# Patient Record
Sex: FEMALE | Race: WHITE | Employment: OTHER | URBAN - METROPOLITAN AREA
[De-identification: names, ages, dates, MRNs, and addresses within clinical notes are randomized per-mention and may not be internally consistent; named-entity substitution may affect disease eponyms.]

---

## 2017-07-26 PROCEDURE — 88305 TISSUE EXAM BY PATHOLOGIST: CPT | Performed by: INTERNAL MEDICINE

## 2018-05-29 PROBLEM — M18.12 PRIMARY OSTEOARTHRITIS OF FIRST CARPOMETACARPAL JOINT OF LEFT HAND: Status: ACTIVE | Noted: 2018-05-29

## 2021-08-23 ENCOUNTER — LAB ENCOUNTER (OUTPATIENT)
Dept: LAB | Age: 56
End: 2021-08-23
Attending: INTERNAL MEDICINE
Payer: COMMERCIAL

## 2021-08-23 ENCOUNTER — OFFICE VISIT (OUTPATIENT)
Dept: INTERNAL MEDICINE CLINIC | Facility: CLINIC | Age: 56
End: 2021-08-23
Payer: COMMERCIAL

## 2021-08-23 VITALS
HEART RATE: 66 BPM | SYSTOLIC BLOOD PRESSURE: 102 MMHG | TEMPERATURE: 97 F | RESPIRATION RATE: 16 BRPM | DIASTOLIC BLOOD PRESSURE: 60 MMHG | HEIGHT: 70 IN | BODY MASS INDEX: 29.63 KG/M2 | WEIGHT: 207 LBS | OXYGEN SATURATION: 93 %

## 2021-08-23 DIAGNOSIS — Z13.0 SCREENING FOR BLOOD DISEASE: ICD-10-CM

## 2021-08-23 DIAGNOSIS — Z13.220 SCREENING FOR LIPID DISORDERS: ICD-10-CM

## 2021-08-23 DIAGNOSIS — G47.33 OSA (OBSTRUCTIVE SLEEP APNEA): Primary | ICD-10-CM

## 2021-08-23 DIAGNOSIS — M79.673 CHRONIC FOOT PAIN, UNSPECIFIED LATERALITY: ICD-10-CM

## 2021-08-23 DIAGNOSIS — Z13.228 SCREENING FOR METABOLIC DISORDER: ICD-10-CM

## 2021-08-23 DIAGNOSIS — Z00.00 LABORATORY EXAMINATION ORDERED AS PART OF A COMPLETE PHYSICAL EXAMINATION: ICD-10-CM

## 2021-08-23 DIAGNOSIS — Z12.31 ENCOUNTER FOR SCREENING MAMMOGRAM FOR MALIGNANT NEOPLASM OF BREAST: ICD-10-CM

## 2021-08-23 DIAGNOSIS — F33.42 RECURRENT MAJOR DEPRESSION IN FULL REMISSION (HCC): ICD-10-CM

## 2021-08-23 DIAGNOSIS — G89.29 CHRONIC FOOT PAIN, UNSPECIFIED LATERALITY: ICD-10-CM

## 2021-08-23 DIAGNOSIS — Z13.29 SCREENING FOR THYROID DISORDER: ICD-10-CM

## 2021-08-23 DIAGNOSIS — F41.9 ANXIETY: ICD-10-CM

## 2021-08-23 LAB
ALBUMIN SERPL-MCNC: 3.7 G/DL (ref 3.4–5)
ALBUMIN/GLOB SERPL: 1 {RATIO} (ref 1–2)
ALP LIVER SERPL-CCNC: 74 U/L
ALT SERPL-CCNC: 56 U/L
ANION GAP SERPL CALC-SCNC: 4 MMOL/L (ref 0–18)
AST SERPL-CCNC: 27 U/L (ref 15–37)
BASOPHILS # BLD AUTO: 0.05 X10(3) UL (ref 0–0.2)
BASOPHILS NFR BLD AUTO: 0.8 %
BILIRUB SERPL-MCNC: 0.5 MG/DL (ref 0.1–2)
BUN BLD-MCNC: 23 MG/DL (ref 7–18)
CALCIUM BLD-MCNC: 9 MG/DL (ref 8.5–10.1)
CHLORIDE SERPL-SCNC: 107 MMOL/L (ref 98–112)
CHOLEST SMN-MCNC: 243 MG/DL (ref ?–200)
CO2 SERPL-SCNC: 28 MMOL/L (ref 21–32)
CREAT BLD-MCNC: 1.35 MG/DL
EOSINOPHIL # BLD AUTO: 0.3 X10(3) UL (ref 0–0.7)
EOSINOPHIL NFR BLD AUTO: 4.8 %
ERYTHROCYTE [DISTWIDTH] IN BLOOD BY AUTOMATED COUNT: 11.8 %
GLOBULIN PLAS-MCNC: 3.6 G/DL (ref 2.8–4.4)
GLUCOSE BLD-MCNC: 86 MG/DL (ref 70–99)
HCT VFR BLD AUTO: 44.6 %
HDLC SERPL-MCNC: 48 MG/DL (ref 40–59)
HGB BLD-MCNC: 14.2 G/DL
IMM GRANULOCYTES # BLD AUTO: 0.02 X10(3) UL (ref 0–1)
IMM GRANULOCYTES NFR BLD: 0.3 %
LDLC SERPL CALC-MCNC: 159 MG/DL (ref ?–100)
LYMPHOCYTES # BLD AUTO: 2.43 X10(3) UL (ref 1–4)
LYMPHOCYTES NFR BLD AUTO: 39.2 %
M PROTEIN MFR SERPL ELPH: 7.3 G/DL (ref 6.4–8.2)
MCH RBC QN AUTO: 29.5 PG (ref 26–34)
MCHC RBC AUTO-ENTMCNC: 31.8 G/DL (ref 31–37)
MCV RBC AUTO: 92.7 FL
MONOCYTES # BLD AUTO: 0.73 X10(3) UL (ref 0.1–1)
MONOCYTES NFR BLD AUTO: 11.8 %
NEUTROPHILS # BLD AUTO: 2.67 X10 (3) UL (ref 1.5–7.7)
NEUTROPHILS # BLD AUTO: 2.67 X10(3) UL (ref 1.5–7.7)
NEUTROPHILS NFR BLD AUTO: 43.1 %
NONHDLC SERPL-MCNC: 195 MG/DL (ref ?–130)
OSMOLALITY SERPL CALC.SUM OF ELEC: 291 MOSM/KG (ref 275–295)
PATIENT FASTING Y/N/NP: YES
PATIENT FASTING Y/N/NP: YES
PLATELET # BLD AUTO: 266 10(3)UL (ref 150–450)
POTASSIUM SERPL-SCNC: 4.7 MMOL/L (ref 3.5–5.1)
RBC # BLD AUTO: 4.81 X10(6)UL
SODIUM SERPL-SCNC: 139 MMOL/L (ref 136–145)
T4 FREE SERPL-MCNC: 0.9 NG/DL (ref 0.8–1.7)
TRIGL SERPL-MCNC: 195 MG/DL (ref 30–149)
TSI SER-ACNC: 5.39 MIU/ML (ref 0.36–3.74)
VLDLC SERPL CALC-MCNC: 38 MG/DL (ref 0–30)
WBC # BLD AUTO: 6.2 X10(3) UL (ref 4–11)

## 2021-08-23 PROCEDURE — 3078F DIAST BP <80 MM HG: CPT | Performed by: INTERNAL MEDICINE

## 2021-08-23 PROCEDURE — 3074F SYST BP LT 130 MM HG: CPT | Performed by: INTERNAL MEDICINE

## 2021-08-23 PROCEDURE — 99204 OFFICE O/P NEW MOD 45 MIN: CPT | Performed by: INTERNAL MEDICINE

## 2021-08-23 PROCEDURE — 3008F BODY MASS INDEX DOCD: CPT | Performed by: INTERNAL MEDICINE

## 2021-08-23 PROCEDURE — 80050 GENERAL HEALTH PANEL: CPT | Performed by: INTERNAL MEDICINE

## 2021-08-23 PROCEDURE — 84439 ASSAY OF FREE THYROXINE: CPT | Performed by: INTERNAL MEDICINE

## 2021-08-23 PROCEDURE — 80061 LIPID PANEL: CPT | Performed by: INTERNAL MEDICINE

## 2021-08-23 RX ORDER — ALPRAZOLAM 0.5 MG/1
0.5 TABLET ORAL DAILY PRN
Qty: 14 TABLET | Refills: 0 | Status: SHIPPED | OUTPATIENT
Start: 2021-08-23 | End: 2021-10-11

## 2021-08-23 NOTE — PROGRESS NOTES
Angelic Seat  3/10/1965    Patient presents with:  Establish Care: RG rm 6 discuss horrible foot pain, discuss recent stop of antidepressants.   Referral: discuss getting referrals      Efrainleón Hoyt is a 64year old female who presents to e Refill   • PROGESTERONE OR Take by mouth. Liquid OTC     • ALPRAZolam 0.5 MG Oral Tab Take 1 tablet (0.5 mg total) by mouth daily as needed for Sleep or Anxiety.  14 tablet 0   • OMEPRAZOLE 20 MG Oral Capsule Delayed Release TAKE ONE CAPSULE BY MOUTH TWICE Normocephalic and atraumatic. TM's wnl. Eyes: Conjunctivae wnl. Neck: Normal range of motion. Neck supple. Normal carotid pulses. No masses. Cardiovascular: Normal rate, regular rhythm and intact distal pulses. No murmur, rubs or gallops.    Pulmonary/ Sonam Holcomb MD

## 2021-09-08 ENCOUNTER — OFFICE VISIT (OUTPATIENT)
Dept: SLEEP CENTER | Age: 56
End: 2021-09-08
Attending: INTERNAL MEDICINE
Payer: COMMERCIAL

## 2021-09-08 DIAGNOSIS — G47.33 OSA (OBSTRUCTIVE SLEEP APNEA): ICD-10-CM

## 2021-09-08 PROCEDURE — 95806 SLEEP STUDY UNATT&RESP EFFT: CPT

## 2021-09-17 NOTE — PROCEDURES
[address]       Accredited by the [facility] ([facility])    PATIENT'S NAME:        Bao De Leon  ATTENDING PHYSICIAN:   Galilea Girffith M.D. REFERRING PHYSICIAN:   Demetris Brannon MD  PATIENT per minute. IMPRESSION:  This study revealed evidence of obstructive sleep apnea with associated oxyhemoglobin desaturations. RECOMMENDATIONS:    1. This patient should proceed with definitive therapy by undergoing CPAP titration.   2.   This patien

## 2021-10-22 ENCOUNTER — TELEPHONE (OUTPATIENT)
Dept: INTERNAL MEDICINE CLINIC | Facility: CLINIC | Age: 56
End: 2021-10-22

## 2021-10-22 NOTE — TELEPHONE ENCOUNTER
Mammogram received from 1 [facility] [address], placed in RN bin for review

## 2021-10-25 ENCOUNTER — MED REC SCAN ONLY (OUTPATIENT)
Dept: INTERNAL MEDICINE CLINIC | Facility: CLINIC | Age: 56
End: 2021-10-25

## 2021-11-01 RX ORDER — ALPRAZOLAM 0.5 MG/1
TABLET ORAL
Qty: 20 TABLET | Refills: 0 | Status: SHIPPED | OUTPATIENT
Start: 2021-11-01 | End: 2021-11-29

## 2021-11-01 NOTE — TELEPHONE ENCOUNTER
Last VISIT 08/23/21    Last CPE Not on file    Last REFILL 10/11/21 qty 20 w/0 refills    Last LABS 10/26/21 Multiple labs done    No future appointments. Per PROTOCOL? Not on protocol      Please Approve or Deny.

## 2021-11-29 RX ORDER — ALPRAZOLAM 0.5 MG/1
TABLET ORAL
Qty: 20 TABLET | Refills: 0 | Status: SHIPPED | OUTPATIENT
Start: 2021-11-29 | End: 2021-12-16

## 2021-12-13 ENCOUNTER — MED REC SCAN ONLY (OUTPATIENT)
Dept: INTERNAL MEDICINE CLINIC | Facility: CLINIC | Age: 56
End: 2021-12-13

## 2021-12-16 RX ORDER — ALPRAZOLAM 0.5 MG/1
TABLET ORAL
Qty: 20 TABLET | Refills: 0 | Status: SHIPPED | OUTPATIENT
Start: 2021-12-16 | End: 2022-01-12

## 2021-12-16 NOTE — TELEPHONE ENCOUNTER
Received fax from pharmacy asking to clarify max daily dose for Xanax. Pending order, please review.

## 2022-01-12 RX ORDER — ALPRAZOLAM 0.5 MG/1
TABLET ORAL
Qty: 20 TABLET | Refills: 0 | Status: SHIPPED | OUTPATIENT
Start: 2022-01-12

## 2022-01-12 NOTE — TELEPHONE ENCOUNTER
Last OV 8/23/21  Last PE   8/23/21  Last REFILL   Medication Quantity Refills Start End   ALPRAZolam 0.5 MG Oral Tab 20 tablet 0 12/16/2021      Last LABS 8/23/21 tsh, lipid, cmp, cbc    Future Appointments   Date Time Provider Jose Bello   1/25/202

## 2022-01-21 RX ORDER — FLUOXETINE HYDROCHLORIDE 20 MG/1
CAPSULE ORAL
Qty: 90 CAPSULE | Refills: 1 | Status: SHIPPED | OUTPATIENT
Start: 2022-01-21

## 2022-01-21 RX ORDER — BUPROPION HYDROCHLORIDE 150 MG/1
TABLET ORAL
Qty: 270 TABLET | Refills: 1 | Status: SHIPPED | OUTPATIENT
Start: 2022-01-21

## 2022-01-21 RX ORDER — FLUOXETINE HYDROCHLORIDE 40 MG/1
CAPSULE ORAL
Qty: 90 CAPSULE | Refills: 1 | Status: SHIPPED | OUTPATIENT
Start: 2022-01-21

## 2022-01-21 NOTE — TELEPHONE ENCOUNTER
Last VISIT 21     Last CPE Not on file    Last REFILL 10/18/21   buPROPion 150 MG Oral Tablet 24 Hr () 270 tablet 1     FLUoxetine HCl (PROZAC) 40 MG Oral Cap 90 capsule 1     FLUoxetine (PROZAC) 20 MG Oral Cap 90 capsule 1     Last LABS 10/26

## 2022-02-17 RX ORDER — ALPRAZOLAM 0.5 MG/1
TABLET ORAL
Qty: 20 TABLET | Refills: 0 | Status: SHIPPED | OUTPATIENT
Start: 2022-02-17 | End: 2022-03-14

## 2022-02-17 RX ORDER — LEVOTHYROXINE SODIUM 0.03 MG/1
TABLET ORAL
Qty: 90 TABLET | Refills: 0 | Status: SHIPPED | OUTPATIENT
Start: 2022-02-17

## 2022-02-17 NOTE — TELEPHONE ENCOUNTER
Last VISIT 08/23/21    Last CPE Not on file     Last REFILL 01/12/22 qty 20 w/0 refills    Last LABS 10/26/21 Multiple labs done    No Future Appointments      Per PROTOCOL? Not on protocol      Please Approve or Deny.

## 2022-03-14 RX ORDER — ALPRAZOLAM 0.5 MG/1
TABLET ORAL
Qty: 20 TABLET | Refills: 0 | Status: SHIPPED | OUTPATIENT
Start: 2022-03-14

## 2022-03-14 NOTE — TELEPHONE ENCOUNTER
Last VISIT 08/23/21    Last CPE Not on file    Last REFILL 02/17/22 qty 20 w/0 refills    Last LABS 10/26/21 Multiple labs done     No Future Appointments      Per PROTOCOL? Not on protocol      Please Approve or Deny.

## 2022-03-29 ENCOUNTER — MED REC SCAN ONLY (OUTPATIENT)
Dept: INTERNAL MEDICINE CLINIC | Facility: CLINIC | Age: 57
End: 2022-03-29

## 2022-04-06 RX ORDER — ALPRAZOLAM 0.5 MG/1
TABLET ORAL
Qty: 20 TABLET | Refills: 0 | Status: SHIPPED | OUTPATIENT
Start: 2022-04-06

## 2022-04-25 RX ORDER — ALPRAZOLAM 0.5 MG/1
TABLET ORAL
Qty: 20 TABLET | Refills: 0 | Status: SHIPPED | OUTPATIENT
Start: 2022-04-25

## 2022-04-25 NOTE — TELEPHONE ENCOUNTER
Last VISIT 08/23/21    Last CPE Not on file    Last REFILL 04/06/22 qty 20 w/0 refills    Last LABS 04/05/22 Multiple labs done    No future appointments. Per PROTOCOL? Not on protocol      Please Approve or Deny.

## 2022-05-16 PROBLEM — E03.9 HYPOTHYROIDISM: Status: ACTIVE | Noted: 2022-05-16

## 2022-05-16 PROBLEM — F41.9 ANXIETY: Status: ACTIVE | Noted: 2022-05-16

## 2022-05-16 RX ORDER — LEVOTHYROXINE SODIUM 0.03 MG/1
TABLET ORAL
Qty: 90 TABLET | Refills: 0 | Status: SHIPPED | OUTPATIENT
Start: 2022-05-16

## 2022-05-20 RX ORDER — ALPRAZOLAM 0.5 MG/1
TABLET ORAL
Qty: 30 TABLET | Refills: 0 | Status: SHIPPED | OUTPATIENT
Start: 2022-05-20 | End: 2022-05-31

## 2022-05-31 RX ORDER — ALPRAZOLAM 0.5 MG/1
TABLET ORAL
Qty: 30 TABLET | Refills: 0 | Status: SHIPPED | OUTPATIENT
Start: 2022-05-31

## 2022-05-31 NOTE — TELEPHONE ENCOUNTER
Last VISIT 05/16/22    Last CPE Not on file    Last REFILL 05/20/22 qty 30 w/0 refills    Last LABS 04/05/22 Multiple labs done    No future appointments. Per PROTOCOL? Not on protocol      Please Approve or Deny.

## 2022-07-01 RX ORDER — ALPRAZOLAM 0.5 MG/1
TABLET ORAL
Qty: 30 TABLET | Refills: 0 | Status: SHIPPED | OUTPATIENT
Start: 2022-07-01

## 2022-07-26 RX ORDER — ALPRAZOLAM 0.5 MG/1
TABLET ORAL
Qty: 30 TABLET | Refills: 0 | Status: SHIPPED | OUTPATIENT
Start: 2022-07-26

## 2022-07-26 NOTE — TELEPHONE ENCOUNTER
Last VISIT 05/16/22    Last CPE Not on file    Last REFILL 07/01/22 qty 30 w/0 refills    Last LABS 04/05/22 Multiple labs done    No future appointments. Per PROTOCOL? Not on protocol      Please Approve or Deny.

## 2022-07-31 DIAGNOSIS — M17.12 ARTHRITIS OF KNEE, LEFT: ICD-10-CM

## 2022-08-01 RX ORDER — BUPROPION HYDROCHLORIDE 150 MG/1
TABLET ORAL
Qty: 270 TABLET | Refills: 1 | Status: SHIPPED | OUTPATIENT
Start: 2022-08-01

## 2022-08-01 RX ORDER — FLUOXETINE HYDROCHLORIDE 20 MG/1
CAPSULE ORAL
Qty: 90 CAPSULE | Refills: 1 | Status: SHIPPED | OUTPATIENT
Start: 2022-08-01

## 2022-08-01 RX ORDER — FLUOXETINE HYDROCHLORIDE 40 MG/1
CAPSULE ORAL
Qty: 90 CAPSULE | Refills: 0 | Status: SHIPPED | OUTPATIENT
Start: 2022-08-01

## 2022-08-01 NOTE — TELEPHONE ENCOUNTER
Last VISIT 05/16/22    Last CPE Not on file    Last REFILL 10/18/21  FLUoxetine HCl (PROZAC) 40 MG Oral Cap (Discontinued) 90 capsule 1     01/21/22   BUPROPION 150 MG Oral Tablet 24 Hr 270 tablet 1     FLUOXETINE 20 MG Oral Cap 90 capsule 1     Last LABS 04/05/22 UA done    No future appointments. Per PROTOCOL? Not on protocol      Please Approve or Deny.

## 2022-08-13 ENCOUNTER — OFFICE VISIT (OUTPATIENT)
Dept: FAMILY MEDICINE CLINIC | Facility: CLINIC | Age: 57
End: 2022-08-13
Payer: COMMERCIAL

## 2022-08-13 VITALS
DIASTOLIC BLOOD PRESSURE: 82 MMHG | RESPIRATION RATE: 18 BRPM | SYSTOLIC BLOOD PRESSURE: 116 MMHG | OXYGEN SATURATION: 98 % | HEART RATE: 79 BPM | TEMPERATURE: 97 F

## 2022-08-13 DIAGNOSIS — J02.9 SORE THROAT: Primary | ICD-10-CM

## 2022-08-13 DIAGNOSIS — H92.01 RIGHT EAR PAIN: ICD-10-CM

## 2022-08-13 LAB
CONTROL LINE PRESENT WITH A CLEAR BACKGROUND (YES/NO): YES YES/NO
OPERATOR ID: NORMAL
POCT LOT NUMBER: NORMAL
RAPID SARS-COV-2 BY PCR: NOT DETECTED

## 2022-08-13 PROCEDURE — 3074F SYST BP LT 130 MM HG: CPT | Performed by: NURSE PRACTITIONER

## 2022-08-13 PROCEDURE — 87880 STREP A ASSAY W/OPTIC: CPT | Performed by: NURSE PRACTITIONER

## 2022-08-13 PROCEDURE — U0002 COVID-19 LAB TEST NON-CDC: HCPCS | Performed by: NURSE PRACTITIONER

## 2022-08-13 PROCEDURE — 3079F DIAST BP 80-89 MM HG: CPT | Performed by: NURSE PRACTITIONER

## 2022-08-13 PROCEDURE — 99213 OFFICE O/P EST LOW 20 MIN: CPT | Performed by: NURSE PRACTITIONER

## 2022-08-15 RX ORDER — LEVOTHYROXINE SODIUM 0.03 MG/1
TABLET ORAL
Qty: 90 TABLET | Refills: 0 | Status: SHIPPED | OUTPATIENT
Start: 2022-08-15

## 2022-08-19 ENCOUNTER — MED REC SCAN ONLY (OUTPATIENT)
Dept: INTERNAL MEDICINE CLINIC | Facility: CLINIC | Age: 57
End: 2022-08-19

## 2022-08-24 RX ORDER — ALPRAZOLAM 0.5 MG/1
TABLET ORAL
Qty: 30 TABLET | Refills: 0 | Status: SHIPPED | OUTPATIENT
Start: 2022-08-24

## 2022-08-24 NOTE — TELEPHONE ENCOUNTER
Last VISIT 05/16/22    Last CPE Not on file    Last REFILL 07/26/22 qty 30 w/0 refills    Last LABS 08/13/22 Covid ans Strep done    No future appointments. Per PROTOCOL? Not on protocol      Please Approve or Deny.

## 2022-09-06 RX ORDER — ALPRAZOLAM 0.5 MG/1
TABLET ORAL
Qty: 30 TABLET | Refills: 0 | Status: SHIPPED | OUTPATIENT
Start: 2022-09-06

## 2022-09-06 NOTE — TELEPHONE ENCOUNTER
Last VISIT 05/16/22    Last CPE Not on file     Last REFILL 08/24/22 qty 30 w/0 refills    Last LABS 08/13/22 Strep done    No future appointments. Per PROTOCOL? Not on protocol      Please Approve or Deny.

## 2022-10-13 RX ORDER — ALPRAZOLAM 0.5 MG/1
TABLET ORAL
Qty: 30 TABLET | Refills: 0 | Status: SHIPPED | OUTPATIENT
Start: 2022-10-13

## 2022-10-26 RX ORDER — ALPRAZOLAM 0.5 MG/1
TABLET ORAL
Qty: 30 TABLET | Refills: 0 | OUTPATIENT
Start: 2022-10-26

## 2022-11-01 RX ORDER — ALPRAZOLAM 0.5 MG/1
TABLET ORAL
Qty: 14 TABLET | Refills: 0 | Status: SHIPPED | OUTPATIENT
Start: 2022-11-01

## 2022-11-01 NOTE — TELEPHONE ENCOUNTER
Last VISIT 05/16/22    Last CPE Not on file    Last REFILL 10/13/22 qty 30 w/0 refills    Last LABS 08/13/22 Strep, covid done    No future appointments. Per PROTOCOL? Not on protocol      Please Approve or Deny.

## 2022-11-15 DIAGNOSIS — M17.12 ARTHRITIS OF KNEE, LEFT: ICD-10-CM

## 2022-11-15 RX ORDER — FLUOXETINE HYDROCHLORIDE 40 MG/1
CAPSULE ORAL
Qty: 90 CAPSULE | Refills: 0 | Status: SHIPPED | OUTPATIENT
Start: 2022-11-15

## 2022-11-15 NOTE — TELEPHONE ENCOUNTER
Last VISIT 05/16/22    Last CPE Not on file    Last REFILL 08/01/22 qty 90 w/0 refills    Last LABS 08/13/22 Stress test done    No future appointments. Per PROTOCOL? Not on protocol        Please Approve or Deny.

## 2022-12-30 ENCOUNTER — APPOINTMENT (OUTPATIENT)
Dept: GENERAL RADIOLOGY | Age: 57
End: 2022-12-30
Attending: PHYSICIAN ASSISTANT
Payer: COMMERCIAL

## 2022-12-30 ENCOUNTER — HOSPITAL ENCOUNTER (OUTPATIENT)
Age: 57
Discharge: HOME | End: 2022-12-30
Payer: COMMERCIAL

## 2022-12-30 VITALS
WEIGHT: 210 LBS | HEIGHT: 70 IN | TEMPERATURE: 98 F | RESPIRATION RATE: 20 BRPM | BODY MASS INDEX: 30.06 KG/M2 | HEART RATE: 90 BPM | OXYGEN SATURATION: 99 % | SYSTOLIC BLOOD PRESSURE: 130 MMHG | DIASTOLIC BLOOD PRESSURE: 84 MMHG

## 2022-12-30 DIAGNOSIS — R06.2 WHEEZING: ICD-10-CM

## 2022-12-30 DIAGNOSIS — R05.8 COUGH PRODUCTIVE OF YELLOW SPUTUM: Primary | ICD-10-CM

## 2022-12-30 LAB — SARS-COV-2 RNA RESP QL NAA+PROBE: NOT DETECTED

## 2022-12-30 PROCEDURE — 71046 X-RAY EXAM CHEST 2 VIEWS: CPT | Performed by: PHYSICIAN ASSISTANT

## 2022-12-30 PROCEDURE — U0002 COVID-19 LAB TEST NON-CDC: HCPCS | Performed by: PHYSICIAN ASSISTANT

## 2022-12-30 PROCEDURE — 99203 OFFICE O/P NEW LOW 30 MIN: CPT | Performed by: PHYSICIAN ASSISTANT

## 2022-12-30 RX ORDER — BENZONATATE 100 MG/1
100 CAPSULE ORAL 3 TIMES DAILY PRN
Qty: 30 CAPSULE | Refills: 0 | Status: SHIPPED | OUTPATIENT
Start: 2022-12-30 | End: 2023-01-29

## 2022-12-30 RX ORDER — MONTELUKAST SODIUM 10 MG/1
10 TABLET ORAL NIGHTLY
Qty: 30 TABLET | Refills: 0 | Status: SHIPPED | OUTPATIENT
Start: 2022-12-30

## 2022-12-30 RX ORDER — PREDNISONE 20 MG/1
40 TABLET ORAL DAILY
Qty: 8 TABLET | Refills: 0 | Status: SHIPPED | OUTPATIENT
Start: 2022-12-30 | End: 2023-01-03

## 2022-12-30 RX ORDER — ALBUTEROL SULFATE 90 UG/1
2 AEROSOL, METERED RESPIRATORY (INHALATION) ONCE
Status: COMPLETED | OUTPATIENT
Start: 2022-12-30 | End: 2022-12-30

## 2022-12-30 RX ORDER — DEXAMETHASONE 4 MG/1
12 TABLET ORAL ONCE
Status: COMPLETED | OUTPATIENT
Start: 2022-12-30 | End: 2022-12-30

## 2022-12-30 NOTE — ED INITIAL ASSESSMENT (HPI)
Pt with co cough milky yellow secretions. Cough worse at night. Pt also with ha and sorethroat. Unsure if fever. Co aches and sweating.

## 2022-12-30 NOTE — DISCHARGE INSTRUCTIONS
Please return to the ER/clinic if symptoms worsen. Follow-up with your PCP in 24-48 hours as needed. The Decadron will work in your system the next several days. We will send in some additional prednisone to start on day 2 or 3 if symptoms persist.  Take the Singulair daily. Also recommend sleeping more upright to help with the cough as well as purchasing some over-the-counter Cepacol spray to help with the cough reflex. Use your inhaler every 4-6 hours as needed. You may take the Tessalon Perles to help with the cough. Motrin and/or Tylenol for fever and pain. If anything changes i.e. increasing fevers or shortness of breath go directly to the emergency room. Otherwise follow-up with your primary care physician for further evaluation and treatment.

## 2023-01-11 DIAGNOSIS — F41.9 ANXIETY: ICD-10-CM

## 2023-01-11 RX ORDER — ALPRAZOLAM 0.5 MG/1
TABLET ORAL
Qty: 20 TABLET | Refills: 0 | Status: SHIPPED | OUTPATIENT
Start: 2023-01-11

## 2023-01-11 NOTE — TELEPHONE ENCOUNTER
Last OV: 11/30/22 f/u meds  Last CPE: unknown    No future appointments. Latest labs: 4/5/22 CBC and CMP    Latest RX: ALPRAZOLAM 0.5MG TABLETS 20 tabs 0 refills on 11/30/22    Per protocol, not on protocol. RX Pending.

## 2023-02-20 DIAGNOSIS — F41.9 ANXIETY: ICD-10-CM

## 2023-02-21 RX ORDER — ALPRAZOLAM 0.5 MG/1
TABLET ORAL
Qty: 20 TABLET | Refills: 0 | Status: SHIPPED | OUTPATIENT
Start: 2023-02-21

## 2023-02-21 NOTE — TELEPHONE ENCOUNTER
Last visit- 05/16/2022 anxiety    Last refill- 01/11/2023 alprazolam 0.5mg QTY20 0R    Last labs- 04/05/2022 cbc, cmp  Ordered by Rizwana Burk, DO    No future appointments. Per Protocol?   Please approve or deny

## 2023-03-08 DIAGNOSIS — F41.9 ANXIETY: ICD-10-CM

## 2023-03-10 RX ORDER — ALPRAZOLAM 0.5 MG/1
TABLET ORAL
Qty: 20 TABLET | Refills: 0 | Status: SHIPPED | OUTPATIENT
Start: 2023-03-10

## 2023-03-30 DIAGNOSIS — F41.9 ANXIETY: ICD-10-CM

## 2023-04-03 RX ORDER — ALPRAZOLAM 0.5 MG/1
TABLET ORAL
Qty: 20 TABLET | Refills: 0 | Status: SHIPPED | OUTPATIENT
Start: 2023-04-03

## 2023-04-03 NOTE — TELEPHONE ENCOUNTER
Last visit- 05/16/2022 anxiety     Last refill- 03/10/2023 alprazolam 0.5mg QTY20 0R    Last labs- 04/05/2022 cbc, cmp    No future appointments. Per Protocol?   Please approve or deny

## 2023-05-01 DIAGNOSIS — F41.9 ANXIETY: ICD-10-CM

## 2023-05-02 RX ORDER — BUPROPION HYDROCHLORIDE 150 MG/1
450 TABLET ORAL DAILY
Qty: 90 TABLET | Refills: 0 | Status: SHIPPED | OUTPATIENT
Start: 2023-05-02 | End: 2023-06-01

## 2023-05-02 RX ORDER — FLUOXETINE HYDROCHLORIDE 20 MG/1
20 CAPSULE ORAL DAILY
Qty: 30 CAPSULE | Refills: 0 | Status: SHIPPED | OUTPATIENT
Start: 2023-05-02

## 2023-05-02 RX ORDER — ALPRAZOLAM 0.5 MG/1
TABLET ORAL
Qty: 20 TABLET | Refills: 0 | Status: SHIPPED | OUTPATIENT
Start: 2023-05-02

## 2023-05-02 NOTE — TELEPHONE ENCOUNTER
Last OV 11/30/2022   Last PE unknown  Last REFILL Alprazolam 4/3/23 Fluoxetine 11/30/2022 Bupropion 08/01/2022  Last LABS 4/5/2022    No future appointments. Per PROTOCOL?     Please Advise

## 2023-05-24 DIAGNOSIS — F41.9 ANXIETY: ICD-10-CM

## 2023-05-25 RX ORDER — ALPRAZOLAM 0.5 MG/1
TABLET ORAL
Qty: 10 TABLET | Refills: 0 | Status: SHIPPED | OUTPATIENT
Start: 2023-05-25

## 2023-06-12 DIAGNOSIS — F41.9 ANXIETY: ICD-10-CM

## 2023-06-12 RX ORDER — ALPRAZOLAM 0.5 MG/1
TABLET ORAL
Qty: 10 TABLET | Refills: 0 | Status: SHIPPED | OUTPATIENT
Start: 2023-06-12

## 2023-06-12 NOTE — TELEPHONE ENCOUNTER
Last VISIT 11/30/2022    Last CPE Not done is last 2 years    Last REFILL  Medication Quantity Refills Start End   ALPRAZolam 0.5 MG Oral Tab 10 tablet 0 5/25/2023    Sig: Sarah Antu 1 TABLET(0.5 MG) BY MOUTH DAILY AS NEEDED FOR SLEEP OR ANXIETY       Last LABS  CBC,CMP,Sed Rate, C- Reactive- 04/05/2022    No future appointments. Per PROTOCOL? Refill pended    Please Approve or Deny.

## 2023-08-04 DIAGNOSIS — F51.04 PSYCHOPHYSIOLOGICAL INSOMNIA: ICD-10-CM

## 2023-08-07 RX ORDER — LEVOTHYROXINE SODIUM 0.03 MG/1
25 TABLET ORAL
Qty: 30 TABLET | Refills: 0 | Status: SHIPPED | OUTPATIENT
Start: 2023-08-07

## 2023-08-07 NOTE — TELEPHONE ENCOUNTER
Thyroid Supplements Protocol [id number]/07/2023 10:01 AM   Protocol Details TSH test in past 12 months    TSH value between 0.350 and 5.500 IU/ml    Appointment in past 12 or next 3 months     Component  Ref Range & Units 4/5/22  2:11 PM   TSH  0.270 - 4.200 mIU/L 1.670       Pt is overdue for annual     Please call to help patient schedule annual CPE     Last visit acute concern 5/16/2022

## 2023-08-07 NOTE — TELEPHONE ENCOUNTER
Last OV: 11/30/22    No future appointments. Latest labs: 8/23/21 T4, TSH, Lipid, CMP and CBC    Latest RX:  HYDROXYZINE HCL 50MG TABS (YELLOW)  30 tabs 0 refills on 11/30/22    Per protocol, not on protocol. Rx pending.

## 2023-08-08 RX ORDER — HYDROXYZINE 50 MG/1
50 TABLET, FILM COATED ORAL NIGHTLY PRN
Qty: 90 TABLET | Refills: 0 | Status: SHIPPED | OUTPATIENT
Start: 2023-08-08

## 2023-08-08 NOTE — TELEPHONE ENCOUNTER
Lots of care gaps to address and repeat BP. Can she be schedule for appointment please?  Will also need repeat pap in Nov

## 2023-08-15 ENCOUNTER — TELEPHONE (OUTPATIENT)
Dept: INTERNAL MEDICINE CLINIC | Facility: CLINIC | Age: 58
End: 2023-08-15

## 2023-08-15 DIAGNOSIS — Z13.220 SCREENING FOR LIPID DISORDERS: ICD-10-CM

## 2023-08-15 DIAGNOSIS — Z13.29 SCREENING FOR THYROID DISORDER: ICD-10-CM

## 2023-08-15 DIAGNOSIS — Z13.228 SCREENING FOR METABOLIC DISORDER: ICD-10-CM

## 2023-08-15 DIAGNOSIS — Z13.0 SCREENING FOR DISORDER OF BLOOD AND BLOOD-FORMING ORGANS: ICD-10-CM

## 2023-08-15 DIAGNOSIS — Z00.00 ROUTINE GENERAL MEDICAL EXAMINATION AT A HEALTH CARE FACILITY: Primary | ICD-10-CM

## 2023-08-15 NOTE — TELEPHONE ENCOUNTER
Future Appointments   Date Time Provider Jose Bello   9/15/2023 10:20 AM Clemente Zamora MD [facility]     Informed must fast no call back required.  Orders to Gary Chan

## 2023-08-15 NOTE — TELEPHONE ENCOUNTER
Future Appointments   Date Time Provider Jose Bello   9/15/2023 10:20 AM Jeremi Mckenna MD [facility]

## 2023-09-05 DIAGNOSIS — F41.9 ANXIETY: ICD-10-CM

## 2023-09-05 RX ORDER — ALPRAZOLAM 0.5 MG/1
TABLET ORAL
Qty: 10 TABLET | Refills: 0 | Status: SHIPPED | OUTPATIENT
Start: 2023-09-05

## 2023-09-05 NOTE — TELEPHONE ENCOUNTER
Last VISIT - 11/30/22 f/u for refill of alprazolam     Last CPE - No recent physical    Last REFILL -     ALPRAZOLAM 0.5 MG Oral Tab 10 tablet 0 8/7/2023     Last LABS - 8/24/23 Active tsh, lipid, cmp, cbc    Future Appointments   Date Time Provider Jose Bello   9/15/2023 10:20 AM Jackelyn Proctor MD [facility]     Per PROTOCOL? None    Please Approve or Deny. Statement Selected

## 2023-09-20 DIAGNOSIS — M17.12 ARTHRITIS OF KNEE, LEFT: ICD-10-CM

## 2023-09-21 RX ORDER — BUPROPION HYDROCHLORIDE 150 MG/1
450 TABLET ORAL DAILY
Qty: 90 TABLET | Refills: 0 | Status: SHIPPED | OUTPATIENT
Start: 2023-09-21 | End: 2023-10-21

## 2023-09-21 RX ORDER — FLUOXETINE HYDROCHLORIDE 20 MG/1
20 CAPSULE ORAL DAILY
Qty: 30 CAPSULE | Refills: 0 | Status: SHIPPED | OUTPATIENT
Start: 2023-09-21

## 2023-09-21 RX ORDER — FLUOXETINE HYDROCHLORIDE 40 MG/1
40 CAPSULE ORAL DAILY
Qty: 30 CAPSULE | Refills: 0 | Status: SHIPPED | OUTPATIENT
Start: 2023-09-21

## 2023-09-21 NOTE — TELEPHONE ENCOUNTER
Not seen in greater than one year. Needs to maintain Q6mo follow-up for management of mental health and further refills. #30 ordered. Please schedule 40-minute follow-up. I can accommodate on any w-f after clinic hours if nothing available.

## 2023-09-26 ENCOUNTER — MED REC SCAN ONLY (OUTPATIENT)
Dept: INTERNAL MEDICINE CLINIC | Facility: CLINIC | Age: 58
End: 2023-09-26

## 2023-09-27 RX ORDER — FLUOXETINE HYDROCHLORIDE 20 MG/1
20 CAPSULE ORAL DAILY
Qty: 30 CAPSULE | Refills: 0 | Status: SHIPPED | OUTPATIENT
Start: 2023-09-27

## 2023-09-27 NOTE — TELEPHONE ENCOUNTER
Last VISIT - 11/30/22 with MP for chronic issues     Last CPE - No recent physical     Last REFILL -     FLUoxetine HCl 40 MG Oral Cap 30 capsule 0 9/21/2023     Last LABS - 8/24/23 tsh, lipid, cmp, cbc    No future appointments. Per PROTOCOL? None     Please Approve or Deny.

## 2023-10-14 DIAGNOSIS — F41.9 ANXIETY: ICD-10-CM

## 2023-10-16 RX ORDER — ALPRAZOLAM 0.5 MG/1
TABLET ORAL
Qty: 10 TABLET | Refills: 0 | Status: SHIPPED | OUTPATIENT
Start: 2023-10-16

## 2023-10-16 NOTE — TELEPHONE ENCOUNTER
Apt 10/19/23 with Dr. Gabby Donohue     Last filled     ALPRAZolam 0.5 MG Oral Tab 10 tablet 0 9/5/2023     Sig: TAKE 1 TABLET(0.5 MG) BY MOUTH DAILY AS NEEDED FOR SLEEP OR ANXIETY    Sent to pharmacy as: ALPRAZolam 0.5 MG Oral Tablet (Xanax)    E-Prescribing Status: Receipt confirmed by pharmacy (9/5/2023  5:28 PM CDT)    Renewals    Renewal provider: Red Esqueda MD         [facility]    [address], [address]  [facility], [phone], [phone]

## 2023-10-19 DIAGNOSIS — M17.12 ARTHRITIS OF KNEE, LEFT: ICD-10-CM

## 2023-10-19 RX ORDER — BUPROPION HYDROCHLORIDE 150 MG/1
450 TABLET ORAL DAILY
Qty: 90 TABLET | Refills: 1 | Status: SHIPPED | OUTPATIENT
Start: 2023-10-19

## 2023-10-19 RX ORDER — FLUOXETINE HYDROCHLORIDE 40 MG/1
40 CAPSULE ORAL DAILY
Qty: 90 CAPSULE | Refills: 1 | Status: SHIPPED | OUTPATIENT
Start: 2023-10-19

## 2023-11-02 DIAGNOSIS — F41.9 ANXIETY: ICD-10-CM

## 2023-11-03 RX ORDER — ALPRAZOLAM 0.5 MG/1
TABLET ORAL
Qty: 10 TABLET | Refills: 0 | Status: SHIPPED | OUTPATIENT
Start: 2023-11-03

## 2023-11-03 NOTE — TELEPHONE ENCOUNTER
Requested Prescriptions     Pending Prescriptions Disp Refills    ALPRAZOLAM 0.5 MG Oral Tab [Pharmacy Med Name: ALPRAZOLAM 0.5MG TABLETS] 10 tablet 0     Sig: TAKE 1 TABLET(0.5 MG) BY MOUTH DAILY AS NEEDED FOR SLEEP OR ANXIETY       LOV: 10/19/23    LAST PHYSICAL: unknown    LAST REFILL: xanax-10-10/16/23    LAST LAB: 4/5/22    Your Appointments      Friday December 01, 2023  1:00 PM  Physical - Established with oLuann Leon MD  [address], [place] ([facility]/ [facility]) [address] [place] [address] [place] [id number] [phone]

## 2023-11-05 DIAGNOSIS — F51.04 PSYCHOPHYSIOLOGICAL INSOMNIA: ICD-10-CM

## 2023-11-07 RX ORDER — HYDROXYZINE 50 MG/1
50 TABLET, FILM COATED ORAL NIGHTLY PRN
Qty: 90 TABLET | Refills: 0 | Status: SHIPPED | OUTPATIENT
Start: 2023-11-07

## 2023-11-07 NOTE — TELEPHONE ENCOUNTER
Last OV: Medication follow up 10/1923 AD    Future Appointments   Date Time Provider Jose Mcnamarai   12/1/2023  1:00 PM Roque Cornell MD [facility]        Latest labs:   [facility] 04/05/22    Latest RX:   Medication Quantity Refills Start End   hydrOXYzine 50 MG Oral Tab 90 tablet 0 8/8/2023    Sig:   TAKE 1 TABLET(50 MG) BY MOUTH EVERY NIGHT AS NEEDED FOR ITCHING         Per protocol  Non-protocol

## 2023-12-01 DIAGNOSIS — F41.9 ANXIETY: ICD-10-CM

## 2023-12-01 RX ORDER — ALPRAZOLAM 0.5 MG/1
TABLET ORAL
Qty: 10 TABLET | Refills: 0 | Status: SHIPPED | OUTPATIENT
Start: 2023-12-01

## 2023-12-20 DIAGNOSIS — F41.9 ANXIETY: ICD-10-CM

## 2023-12-21 RX ORDER — ALPRAZOLAM 0.5 MG/1
TABLET ORAL
Qty: 10 TABLET | Refills: 0 | Status: SHIPPED | OUTPATIENT
Start: 2023-12-21

## 2024-01-06 NOTE — TELEPHONE ENCOUNTER
Requested Prescriptions     Pending Prescriptions Disp Refills    FLUOXETINE 20 MG Oral Cap [Pharmacy Med Name: FLUOXETINE 20MG CAPSULES] 30 capsule 0     Sig: TAKE 1 CAPSULE(20 MG) BY MOUTH DAILY       LOV:  10--a.d.--anxiety    LAST CPE: overdue     Last Labs:  4-5-2022-cbc,cmp    Last Refill: 9--30 caps with 0 refills     Anxiety and depression:  Gradually improving  Continue as needed use of hydroxyzine and alprazolam  Continue fluoxetine total of 60 mg daily  Continue Wellbutrin extended release 450 mg daily    Your appointments       Date & Time Appointment Department ([place])    Apr 05, 2024  1:00 PM CDT Physical - Established with Demetris Máqruez MD [facility], [address] [place] [address] ([facility] [place])              [facility], [address] [place] [address]  [facility] [place]  [address] [place] [id number]  [phone]

## 2024-01-07 RX ORDER — FLUOXETINE HYDROCHLORIDE 20 MG/1
20 CAPSULE ORAL DAILY
Qty: 90 CAPSULE | Refills: 1 | Status: SHIPPED | OUTPATIENT
Start: 2024-01-07

## 2024-01-10 DIAGNOSIS — F41.9 ANXIETY: ICD-10-CM

## 2024-01-11 RX ORDER — ALPRAZOLAM 0.5 MG/1
TABLET ORAL
Qty: 10 TABLET | Refills: 0 | Status: SHIPPED | OUTPATIENT
Start: 2024-01-11 | End: 2024-02-02

## 2024-02-02 DIAGNOSIS — F41.9 ANXIETY: ICD-10-CM

## 2024-02-02 RX ORDER — ALPRAZOLAM 0.5 MG/1
TABLET ORAL
Qty: 10 TABLET | Refills: 0 | Status: SHIPPED | OUTPATIENT
Start: 2024-02-02

## 2024-02-25 DIAGNOSIS — F41.9 ANXIETY: ICD-10-CM

## 2024-02-26 RX ORDER — ALPRAZOLAM 0.5 MG/1
TABLET ORAL
Qty: 10 TABLET | Refills: 0 | Status: SHIPPED | OUTPATIENT
Start: 2024-02-26

## 2024-03-17 DIAGNOSIS — F41.9 ANXIETY: ICD-10-CM

## 2024-03-19 RX ORDER — ALPRAZOLAM 0.5 MG/1
TABLET ORAL
Qty: 20 TABLET | Refills: 0 | Status: SHIPPED | OUTPATIENT
Start: 2024-03-19

## 2024-03-19 NOTE — TELEPHONE ENCOUNTER
Requested Prescriptions     Pending Prescriptions Disp Refills    ALPRAZOLAM 0.5 MG Oral Tab [Pharmacy Med Name: ALPRAZOLAM 0.5MG TABLETS] 10 tablet 0     Sig: TAKE 1 TABLET(0.5 MG) BY MOUTH DAILY AS NEEDED FOR SLEEP OR ANXIETY       LOV:  10--ad-anxiety    LAST CPE: overdue     Last Labs: 4-5-2022-cbc,cmp    Last Refill: 2--10 tabs with 0 refills     Your appointments       Date & Time Appointment Department ([place])    Apr 05, 2024  1:00 PM CDT Physical - Established with Demetris Márquez MD [facility], [address] [place] [address] ([facility] [place])              [facility], [address] [place] [address]  [facility] [place]  [address] [place] [phone]  [phone]

## 2024-04-17 DIAGNOSIS — F41.9 ANXIETY: ICD-10-CM

## 2024-04-19 RX ORDER — ALPRAZOLAM 0.5 MG/1
TABLET ORAL
Qty: 20 TABLET | Refills: 0 | Status: SHIPPED | OUTPATIENT
Start: 2024-04-19

## 2024-05-09 PROBLEM — E66.9 CLASS 2 OBESITY: Status: ACTIVE | Noted: 2024-05-09

## 2024-05-09 PROBLEM — E66.812 CLASS 2 OBESITY: Status: ACTIVE | Noted: 2024-05-09

## 2024-05-28 DIAGNOSIS — F41.9 ANXIETY: ICD-10-CM

## 2024-05-30 RX ORDER — ALPRAZOLAM 0.5 MG/1
0.5 TABLET ORAL DAILY PRN
Qty: 20 TABLET | Refills: 0 | Status: SHIPPED | OUTPATIENT
Start: 2024-05-30

## 2024-05-30 NOTE — TELEPHONE ENCOUNTER
Please review; protocol failed/No Protocol    Recent Fills: 02/26/2024, 03/19/2024, 04/30/2024    Last Rx Written: 04/19/2024    Last Office Visit: 05/09/2024    Requested Prescriptions   Pending Prescriptions Disp Refills    ALPRAZolam 0.5 MG Oral Tab 20 tablet 0     Sig: TAKE 1 TABLET(0.5 MG) BY MOUTH DAILY AS NEEDED FOR SLEEP OR ANXIETY       Controlled Substance Medication Failed - 5/28/2024 11:29 AM        Failed - This medication is a controlled substance - forward to provider to refill           Future Appointments         Provider Department Appt Notes    In 5 months Kala Cat APRN [facility], [address] [place] [address]           Recent Outpatient Visits              3 weeks ago Anxiety    [facility], [address] [place], [place] [address]Manolo Amish, MD    Office Visit    7 months ago Anxiety    [facility], [address] [place], [place] [address]Manolo Amish, MD    Office Visit    1 year ago Anxiety    [facility], [address] [place] [address] [place]    Office Visit    1 year ago Sore throat    [facility], Walk-In Clinic, [place] [address], [place] Sasha Cai APRN    Office Visit    2 years ago Anxiety    [facility], [address] [place], [place] [address]Manolo Amish, MD    Office Visit

## 2024-07-03 DIAGNOSIS — F41.9 ANXIETY: ICD-10-CM

## 2024-07-05 RX ORDER — ALPRAZOLAM 0.5 MG/1
0.5 TABLET ORAL
Qty: 20 TABLET | Refills: 0 | Status: SHIPPED | OUTPATIENT
Start: 2024-07-05

## 2024-07-05 NOTE — TELEPHONE ENCOUNTER
Please Review. Protocol Failed; No Protocol     Recent fills: Quantity: 20  05/30/2024 04/30/2024 03/19/2024                                                                      Patient is due:   Last Rx written: 05/30/2024  Last Office Visit: 05/09/2024        Requested Prescriptions   Pending Prescriptions Disp Refills    ALPRAZOLAM 0.5 MG Oral Tab [Pharmacy Med Name: ALPRAZOLAM 0.5MG TABLETS] 20 tablet 0     Sig: TAKE 1 TABLET(0.5 MG) BY MOUTH DAILY AS NEEDED FOR SLEEP OR ANXIETY       Controlled Substance Medication Failed - 7/3/2024  5:37 PM        Failed - This medication is a controlled substance - forward to provider to refill               Future Appointments         Provider Department Appt Notes    In 3 months Kala Cat APRN [facility], [address] [place] [address]           Recent Outpatient Visits              3 weeks ago Seborrheic keratoses    [facility]ER[facility] REDDY, Alexis Ellis MD    Office Visit    1 month ago Anxiety    [address] Demetris Márquez MD    Office Visit    8 months ago Anxiety    [address]    Office Visit    1 year ago Anxiety    [facility], [address] [place] [address] Mayra Fletcher DO    Office Visit    1 year ago Sore throat    [facility], Walk-In Clinic, [address] Sasha Cai APRN    Office Visit

## 2024-08-07 DIAGNOSIS — F41.9 ANXIETY: ICD-10-CM

## 2024-08-12 RX ORDER — ALPRAZOLAM 0.5 MG/1
0.5 TABLET ORAL
Qty: 20 TABLET | Refills: 0 | Status: SHIPPED | OUTPATIENT
Start: 2024-08-12

## 2024-08-12 NOTE — TELEPHONE ENCOUNTER
Please review. Protocol Failed; No Protocol      Recent fills: 5/30/2024 quantity 20, 7/5/2024 quantity 20, 7/26/2024 quantity 14  Last Rx written: 7/25/2024  by Dr. Maxx Price quantity 14  Last office visit: 5/9/2024      Future Appointments  Date Type Provider Dept   10/09/24 Appointment Demetris Márquez MD [facility] [address] [place] [address]   Showing future appointments within next 150 days with a meds authorizing provider and meeting all other requirements        Requested Prescriptions   Pending Prescriptions Disp Refills    ALPRAZOLAM 0.5 MG Oral Tab [Pharmacy Med Name: ALPRAZOLAM 0.5MG TABLETS] 20 tablet 0     Sig: TAKE 1 TABLET(0.5 MG) BY MOUTH DAILY AS NEEDED       Controlled Substance Medication Failed - 8/7/2024 10:13 PM        Failed - This medication is a controlled substance - forward to provider to refill               Future Appointments         Provider Department Appt Notes    In 1 month Demetris Márquez MD [address] antidepressant medication adjustment. Last Cpe Unknown. ok ja    In 2 months Kala Cat APRN [address]           Recent Outpatient Visits              2 months ago Seborrheic keratoses    [facility], Alexis Ellis MD    Office Visit    3 months ago Anxiety    [address] Demetris Márquez MD    Office Visit    9 months ago Anxiety    [address] Demetris Márquez MD    Office Visit    1 year ago Anxiety    [address] Mayra Fletcher DO    Office Visit    2 years ago Sore throat    [facility], Walk-In Clinic, [address] Sasha Cai APRN    Office Visit

## 2024-09-03 DIAGNOSIS — Z13.0 SCREENING FOR DISORDER OF BLOOD AND BLOOD-FORMING ORGANS: ICD-10-CM

## 2024-09-03 DIAGNOSIS — Z13.220 SCREENING FOR LIPID DISORDERS: ICD-10-CM

## 2024-09-03 DIAGNOSIS — Z13.29 SCREENING FOR THYROID DISORDER: ICD-10-CM

## 2024-09-03 DIAGNOSIS — Z13.228 SCREENING FOR METABOLIC DISORDER: ICD-10-CM

## 2024-09-03 DIAGNOSIS — Z00.00 ROUTINE GENERAL MEDICAL EXAMINATION AT A HEALTH CARE FACILITY: Primary | ICD-10-CM

## 2024-09-15 DIAGNOSIS — F41.9 ANXIETY: ICD-10-CM

## 2024-09-19 DIAGNOSIS — F41.9 ANXIETY: ICD-10-CM

## 2024-09-19 RX ORDER — ALPRAZOLAM 0.5 MG
0.5 TABLET ORAL
Qty: 20 TABLET | Refills: 0 | Status: SHIPPED | OUTPATIENT
Start: 2024-09-19

## 2024-09-19 NOTE — TELEPHONE ENCOUNTER
Please review. Protocol Failed; No Protocol      Recent fills: 7/55/2024, 7/26/2024, 8/12/2024  Last Rx written: 8/12/2024  Last office visit: 5/9/2024    Future Appointments  Date Type Provider Dept   10/09/24 Appointment Demetris Márquez MD [facility] [address] [place] [address]   Showing future appointments within next 150 days with a meds authorizing provider and meeting all other requirements        Requested Prescriptions   Pending Prescriptions Disp Refills    ALPRAZOLAM 0.5 MG Oral Tab [Pharmacy Med Name: ALPRAZOLAM 0.5MG TABLETS] 20 tablet 0     Sig: TAKE 1 TABLET(0.5 MG) BY MOUTH DAILY AS NEEDED       Controlled Substance Medication Failed - 9/15/2024  9:11 PM        Failed - This medication is a controlled substance - forward to provider to refill               Future Appointments         Provider Department Appt Notes    In 2 weeks Demetris Márquez MD [address] antidepressant medication adjustment. Last Cpe Unknown. ok ja    In 4 months Kala Cat APRN [address]           Recent Outpatient Visits              3 months ago Seborrheic keratoses    [facility]ER[facility] REDDY, Alexis Ellis MD    Office Visit    4 months ago Anxiety    [address]    Office Visit    11 months ago Anxiety    [address]    Office Visit    1 year ago Anxiety    [address]    Office Visit    2 years ago Sore throat    [facility], Walk-In Clinic, [address] Sasha Cai APRN    Office Visit

## 2024-09-19 NOTE — TELEPHONE ENCOUNTER
Shruthi Mina requesting Medication Refill for:    Medication name and dose (copy and paste from medication list):   Medication Quantity Refills Start End   ALPRAZolam 0.5 MG Oral Tab 20 tablet 0 8/12/2024 --   Sig:   Take 1 tablet (0.5 mg total) by mouth daily as needed.     Route:   Oral     Order #:   [id number]         If medication is not on medication list - transfer patient to RN queue for triage    [facility] Pharmacy:   [id number] [address] AT [facility] [address], [phone], [phone]   [address] [place] [id number]   Phone: [phone] Fax: [phone]   Hours: Not open 24 hours       LOV: 5/9/2024   Last Refill date: 8/12/24  Next Scheduled appointment: 10/9/2024

## 2024-09-25 RX ORDER — ALPRAZOLAM 0.5 MG
0.5 TABLET ORAL
Qty: 20 TABLET | Refills: 0 | OUTPATIENT
Start: 2024-09-25

## 2024-10-06 DIAGNOSIS — F41.9 ANXIETY: ICD-10-CM

## 2024-10-08 RX ORDER — ALPRAZOLAM 0.5 MG
0.5 TABLET ORAL
Qty: 20 TABLET | Refills: 0 | Status: SHIPPED | OUTPATIENT
Start: 2024-10-08

## 2024-10-08 NOTE — TELEPHONE ENCOUNTER
Please review. Protocol Failed; No Protocol      Recent fills: 7/26/2024, 8/12/2024, 9/19/2024  Last Rx written: 9/19/2024  Last office visit: 5/9/2024    Future Appointments  Date Type Provider Dept   10/09/24 Appointment Demetris Márquez MD [facility] [address] [place] [address]   Showing future appointments within next 150 days with a meds authorizing provider and meeting all other requirements        Requested Prescriptions   Pending Prescriptions Disp Refills    ALPRAZOLAM 0.5 MG Oral Tab [Pharmacy Med Name: ALPRAZOLAM 0.5MG TABLETS] 20 tablet 0     Sig: TAKE 1 TABLET(0.5 MG) BY MOUTH DAILY AS NEEDED       Controlled Substance Medication Failed - 10/6/2024  6:53 AM        Failed - This medication is a controlled substance - forward to provider to refill               Future Appointments         Provider Department Appt Notes    Tomorrow Demetris Márquez MD [address] antidepressant medication adjustment. Last Cpe Unknown. ok ja    In 4 months Kala Cat APRN [facility], [address] [place] [address]           Recent Outpatient Visits              3 months ago Seborrheic keratoses    KERLINE REDDY, Alexis Ellis MD    Office Visit    5 months ago Anxiety    [address], Demetris Haney MD    Office Visit    11 months ago Anxiety    [address] Demetris Haney MD    Office Visit    1 year ago Anxiety    [address], [place] DO    Office Visit    2 years ago Sore throat    [facility], Walk-In Clinic, [address] Sasha Cai APRN    Office Visit

## 2024-11-01 DIAGNOSIS — F41.9 ANXIETY: ICD-10-CM

## 2024-11-05 RX ORDER — ALPRAZOLAM 0.5 MG
0.5 TABLET ORAL
Qty: 20 TABLET | Refills: 0 | Status: SHIPPED | OUTPATIENT
Start: 2024-11-05

## 2024-11-05 NOTE — TELEPHONE ENCOUNTER
Please review; protocol failed/No Protocol    Recent Fills: 08/12/2024, 09/19/2024, 10/08/2024    Last Rx Written: 10/08/2024    Last Office Visit: 05/09/2024  Future Appointments   Date Time Provider Department Center   1/23/2025  1:00 PM Demetris Márquez MD [facility]     Requested Prescriptions   Pending Prescriptions Disp Refills    ALPRAZOLAM 0.5 MG Oral Tab [Pharmacy Med Name: ALPRAZOLAM 0.5MG TABLETS] 20 tablet 0     Sig: TAKE 1 TABLET(0.5 MG) BY MOUTH DAILY AS NEEDED       Controlled Substance Medication Failed - 11/5/2024  3:58 PM        Failed - This medication is a controlled substance - forward to provider to refill           Future Appointments         Provider Department Appt Notes    In 2 months Demetris Márquez MD [facility], [address] [place] [address] antidepressant medication adjustment. Last Cpe Unknown. ok ja    In 3 months Kala Cat APRN [facility], [address] [place] [address]           Recent Outpatient Visits              4 months ago Seborrheic keratoses    [facility], Alexis Ellis MD    Office Visit    6 months ago Anxiety    [address]    Office Visit    1 year ago Anxiety    [address] Demetris Haney MD    Office Visit    1 year ago Anxiety    [address] Mayra Fletcher DO    Office Visit    2 years ago Sore throat    [facility], Walk-In Clinic, [address] Sasha Cai APRN    Office Visit

## 2024-11-19 DIAGNOSIS — F41.9 ANXIETY: ICD-10-CM

## 2024-11-25 RX ORDER — ALPRAZOLAM 0.5 MG
0.5 TABLET ORAL
Qty: 20 TABLET | Refills: 0 | Status: SHIPPED | OUTPATIENT
Start: 2024-11-25

## 2024-11-25 NOTE — TELEPHONE ENCOUNTER
Please review. Protocol Failed; No Protocol      Recent fills: 9/19/2024, 10/8/2024, 11/5/2024  Last Rx written: 11/5/2024  Last office visit: 5/9/2024    Future Appointments  Date Type Provider Dept   01/23/25 Appointment Demetris Márquez MD [facility] [address] [place] [address]   Showing future appointments within next 150 days with a meds authorizing provider and meeting all other requirements        Requested Prescriptions   Pending Prescriptions Disp Refills    ALPRAZOLAM 0.5 MG Oral Tab [Pharmacy Med Name: ALPRAZOLAM 0.5MG TABLETS] 20 tablet 0     Sig: TAKE 1 TABLET(0.5 MG) BY MOUTH DAILY AS NEEDED       Controlled Substance Medication Failed - 11/25/2024 12:22 PM        Failed - This medication is a controlled substance - forward to provider to refill               Future Appointments         Provider Department Appt Notes    In 1 month Demetris Márquez MD [address] antidepressant medication adjustment. Last Cpe Unknown. ok ja    In 2 months Kala Cat APRN [facility], [address] [place] [address]           Recent Outpatient Visits              5 months ago Seborrheic keratoses    KERLINE REDDY, Alexis Ellis MD    Office Visit    6 months ago Anxiety    [address]    Office Visit    1 year ago Anxiety    [address]    Office Visit    1 year ago Anxiety    [address]    Office Visit    2 years ago Sore throat    [facility], [facility], [address] Sasha Cai APRN    Office Visit

## 2024-12-23 DIAGNOSIS — F41.9 ANXIETY: ICD-10-CM

## 2024-12-30 RX ORDER — ALPRAZOLAM 0.5 MG
0.5 TABLET ORAL
Qty: 20 TABLET | Refills: 0 | Status: SHIPPED | OUTPATIENT
Start: 2024-12-30

## 2024-12-30 NOTE — TELEPHONE ENCOUNTER
Please review. Protocol Failed; No Protocol      Recent fills: 10/8/2024, 11/5/2024, 11/26/2024  Last Rx written: 11/25/2024  Last office visit: 5/9/2024    Future Appointments  Date Type Provider Dept   01/23/25 Appointment Demetris Márquez MD [facility] [address] [place] [address]   Showing future appointments within next 150 days with a meds authorizing provider and meeting all other requirements        Requested Prescriptions   Pending Prescriptions Disp Refills    ALPRAZOLAM 0.5 MG Oral Tab [Pharmacy Med Name: ALPRAZOLAM 0.5MG TABLETS] 20 tablet 0     Sig: TAKE 1 TABLET(0.5 MG) BY MOUTH DAILY AS NEEDED       Controlled Substance Medication Failed - 12/30/2024  9:38 AM        Failed - This medication is a controlled substance - forward to provider to refill               Future Appointments         Provider Department Appt Notes    In 3 weeks Demetris Márquez MD [address] antidepressant medication adjustment. Last Cpe Unknown. ok ja    In 1 month Kala Cat APRN [facility], [address] [place] [address]           Recent Outpatient Visits              6 months ago Seborrheic keratoses    KERLINE REDDY, Alexis Ellis MD    Office Visit    7 months ago Anxiety    [address]    Office Visit    1 year ago Anxiety    [address]    Office Visit    2 years ago Anxiety    [address]    Office Visit    2 years ago Sore throat    [facility], [facility]In [facility], [address] Sasha Cai APRN    Office Visit

## 2025-01-23 ENCOUNTER — OFFICE VISIT (OUTPATIENT)
Dept: INTERNAL MEDICINE CLINIC | Facility: CLINIC | Age: 60
End: 2025-01-23
Payer: COMMERCIAL

## 2025-01-23 VITALS
DIASTOLIC BLOOD PRESSURE: 82 MMHG | HEIGHT: 69.09 IN | OXYGEN SATURATION: 95 % | SYSTOLIC BLOOD PRESSURE: 122 MMHG | WEIGHT: 260.38 LBS | HEART RATE: 93 BPM | BODY MASS INDEX: 38.57 KG/M2

## 2025-01-23 DIAGNOSIS — F32.A DEPRESSION, UNSPECIFIED DEPRESSION TYPE: ICD-10-CM

## 2025-01-23 DIAGNOSIS — F41.9 ANXIETY: ICD-10-CM

## 2025-01-23 DIAGNOSIS — G47.33 OSA ON CPAP: ICD-10-CM

## 2025-01-23 DIAGNOSIS — E66.812 CLASS 2 OBESITY: ICD-10-CM

## 2025-01-23 DIAGNOSIS — Z00.00 ROUTINE GENERAL MEDICAL EXAMINATION AT A HEALTH CARE FACILITY: Primary | ICD-10-CM

## 2025-01-23 DIAGNOSIS — F10.21 HISTORY OF ALCOHOL DEPENDENCE (HCC): ICD-10-CM

## 2025-01-23 DIAGNOSIS — E03.9 HYPOTHYROIDISM, UNSPECIFIED TYPE: ICD-10-CM

## 2025-01-23 DIAGNOSIS — Z12.31 ENCOUNTER FOR SCREENING MAMMOGRAM FOR MALIGNANT NEOPLASM OF BREAST: ICD-10-CM

## 2025-01-23 RX ORDER — MIRTAZAPINE 15 MG/1
15 TABLET, FILM COATED ORAL NIGHTLY
COMMUNITY

## 2025-01-23 RX ORDER — VENLAFAXINE 75 MG/1
75 TABLET ORAL DAILY
COMMUNITY

## 2025-01-23 RX ORDER — CLOTRIMAZOLE AND BETAMETHASONE DIPROPIONATE 10; .64 MG/G; MG/G
1 CREAM TOPICAL 2 TIMES DAILY PRN
Qty: 1 EACH | Refills: 1 | Status: SHIPPED | OUTPATIENT
Start: 2025-01-23

## 2025-01-23 NOTE — PROGRESS NOTES
Shruthi Mina  3/10/1965    Chief Complaint   Patient presents with    Physical     Room 7, ASZ, physical exam.  Reviewed Preventative/Wellness form with patient.        HPI:   Shruthi Mina is a 59 year old female who presents for an annual physical examination.    Since last evaluation and  following changes in medical management by the psychiatry service the patient reports overall significant improvement in symptoms of anxiety and depression.  However, as part of her coping for her symptoms she undergoes binge eating and snacking.  She feels that this does temporarily relieve her symptoms of anxiety and improves her mood.  She however has also been put on mirtazapine primarily for sleep.  She has continued venlafaxine 75 mg daily and fluoxetine 20 mg daily with infrequent alprazolam use.  Her weight gain, and associated symptoms, have further challenged her symptoms of anxiety and depression.  Laboratory evaluation is pending collection.    Current Outpatient Medications   Medication Sig Dispense Refill    venlafaxine 75 MG Oral Tab Take 1 tablet (75 mg total) by mouth daily.      mirtazapine 15 MG Oral Tab Take 1 tablet (15 mg total) by mouth nightly.      clotrimazole-betamethasone 1-0.05 % External Cream Apply 1 Application topically 2 (two) times daily as needed. 1 each 1    ALPRAZolam 0.5 MG Oral Tab Take 1 tablet (0.5 mg total) by mouth daily as needed. 20 tablet 0    FLUoxetine 20 MG Oral Cap Take 1 capsule (20 mg total) by mouth daily. 90 capsule 1    potassium chloride 20 MEQ Oral Tab CR Take 1 tablet (20 mEq total) by mouth daily. (Patient not taking: Reported on 1/23/2025)      Potassium Chloride ER 20 MEQ Oral Tab CR Take 1 tablet by mouth daily. (Patient not taking: Reported on 1/23/2025)      furosemide 20 MG Oral Tab Take 1 tablet (20 mg total) by mouth daily. (Patient not taking: Reported on 1/23/2025)      traZODone 50 MG Oral Tab Take 1 tablet (50 mg total) by mouth nightly. (Patient not  taking: Reported on 1/23/2025) 90 tablet 1    hydrOXYzine 50 MG Oral Tab Take 1 tablet (50 mg total) by mouth nightly as needed for Itching. 90 tablet 0    levothyroxine 25 MCG Oral Tab Take 1 tablet (25 mcg total) by mouth before breakfast. (Patient not taking: Reported on 1/23/2025) 30 tablet 0    Estradiol (DIVIGEL TD) Place onto the skin.      montelukast (SINGULAIR) 10 MG Oral Tab Take 1 tablet (10 mg total) by mouth nightly. (Patient not taking: Reported on 1/23/2025) 30 tablet 0    PROGESTERONE OR Take by mouth. Liquid OTC        Allergies[1]   Past Medical History:    Anxiety state, unspecified    DEPRESSION    Depression    JOSSELINE (obstructive sleep apnea)    AHI 6 Supine AHI 10    JOSSELINE on CPAP    Osteoarthrosis, unspecified whether generalized or localized, unspecified site    Panic attacks    PONV (postoperative nausea and vomiting)    states it's mild      Patient Active Problem List   Diagnosis    Depression    JOSSELINE on CPAP    History of alcohol dependence (HCC)    Recurrent major depression in full remission (HCC)    Primary osteoarthritis of first carpometacarpal joint of left hand    Hypothyroidism    Anxiety    Class 2 obesity      Past Surgical History:   Procedure Laterality Date    Endometrial ablation  2005    Knee replacement surgery      bilateral TKA , Dr Fernandez    Other      pelvic sling    Shoulder arthroscopy Right       Family History   Problem Relation Age of Onset    Hypertension Father     Heart Disorder Father         chf    Psychiatric Sister         depression    Psychiatric Brother         depression       Social History     Socioeconomic History    Marital status:    Tobacco Use    Smoking status: Never     Passive exposure: Never    Smokeless tobacco: Never   Vaping Use    Vaping status: Never Used   Substance and Sexual Activity    Alcohol use: No     Alcohol/week: 0.0 standard drinks of alcohol    Drug use: No    Sexual activity: Not Currently   Other Topics Concern    Seat Belt  Yes     Social Drivers of Health     Food Insecurity: No Food Insecurity (1/23/2025)    NCSS - Food Insecurity     Worried About Running Out of Food in the Last Year: No     Ran Out of Food in the Last Year: No   Transportation Needs: No Transportation Needs (1/23/2025)    NCSS - Transportation     Lack of Transportation: No    [facility], [facility]    Social Connections   Housing Stability: Not At Risk (1/23/2025)    NCSS - Housing/Utilities     Has Housing: Yes     Worried About Losing Housing: No     Unable to Get Utilities: No         REVIEW OF SYSTEMS:   GENERAL: feels well otherwise  SKIN: no rashes  EYES:denies blurred vision or double vision  HEENT: Not congested  LUNGS: denies shortness of breath with exertion  CARDIOVASCULAR: denies chest pain on exertion  GI: no nausea or abdominal pain  NEURO: denies headaches    EXAM:   /82 (BP Location: Right arm, Patient Position: Sitting, Cuff Size: adult)   Pulse 93   Ht 5' 9.09\" (1.755 m)   Wt 260 lb 6.4 oz (118.1 kg)   SpO2 95%   BMI 38.35 kg/m²   GENERAL: Well developed, well nourished,in no apparent distress  SKIN: No rashes,no suspicious lesions  EYES: Bilateral conjunctiva are clear  HEENT: atraumatic, normocephalic.  Tympanic membrane within normal limits bilaterally.  NECK: supple,no adenopathy,no bruits  LUNGS: clear to auscultation  CARDIO: RRR without murmur  GI: good BS's,no masses, HSM or tenderness    ASSESSMENT AND PLAN:   Shruthi Mina is a 59 year old female who presents for an annual physical examination.    Outstanding screening and preventive measures:  Influenza immunization: Declined today  Screening for breast cancer: Mammogram ordered    Anxiety and depression:  Improved with current management: Continue fluoxetine 20 mg daily and venlafaxine 75 mg daily.  Taper and discontinue mirtazapine over the next 10 to 14 days  I agreed to assume prescribing of these medications and  recommended 6-month follow-up, or sooner if acute concerns warrant  History of alcohol dependence: resolved    Class II obesity:  Scheduled with weight loss clinic  Discontinue mirtazapine    JOSSELINE:  Stable and controlled with use NiPPV  Following with sleep service    Hypothyroidism:  TSH pending collection   Continue levothyroxine     The patient indicates understanding of these issues and agrees to the plan.    TODAY'S ORDERS     No orders of the defined types were placed in this encounter.      Meds & Refills:  Requested Prescriptions     Signed Prescriptions Disp Refills    clotrimazole-betamethasone 1-0.05 % External Cream 1 each 1     Sig: Apply 1 Application topically 2 (two) times daily as needed.       Imaging & Consults:  OP REFERRAL TO [facility]    No follow-ups on file.  There are no Patient Instructions on file for this visit.    All questions were answered and the patient agrees with the plan.     Thank you,  Demetris Márquez MD       [1]   Allergies  Allergen Reactions    Metoclopramide OTHER (SEE COMMENTS)     Jean-Paul Diaz      Felt strange

## 2025-02-06 ENCOUNTER — OFFICE VISIT (OUTPATIENT)
Dept: INTERNAL MEDICINE CLINIC | Facility: CLINIC | Age: 60
End: 2025-02-06
Payer: COMMERCIAL

## 2025-02-06 VITALS
DIASTOLIC BLOOD PRESSURE: 80 MMHG | HEART RATE: 80 BPM | WEIGHT: 253 LBS | SYSTOLIC BLOOD PRESSURE: 124 MMHG | HEIGHT: 69 IN | RESPIRATION RATE: 16 BRPM | BODY MASS INDEX: 37.47 KG/M2

## 2025-02-06 DIAGNOSIS — F41.9 ANXIETY: ICD-10-CM

## 2025-02-06 DIAGNOSIS — E03.9 HYPOTHYROIDISM, UNSPECIFIED TYPE: ICD-10-CM

## 2025-02-06 DIAGNOSIS — Z51.81 THERAPEUTIC DRUG MONITORING: Primary | ICD-10-CM

## 2025-02-06 DIAGNOSIS — E66.812 CLASS 2 OBESITY: ICD-10-CM

## 2025-02-06 DIAGNOSIS — G47.33 OSA ON CPAP: ICD-10-CM

## 2025-02-06 DIAGNOSIS — E78.2 ELEVATED CHOLESTEROL WITH ELEVATED TRIGLYCERIDES: ICD-10-CM

## 2025-02-06 DIAGNOSIS — M18.12 PRIMARY OSTEOARTHRITIS OF FIRST CARPOMETACARPAL JOINT OF LEFT HAND: ICD-10-CM

## 2025-02-06 PROCEDURE — 99214 OFFICE O/P EST MOD 30 MIN: CPT | Performed by: NURSE PRACTITIONER

## 2025-02-06 PROCEDURE — 3008F BODY MASS INDEX DOCD: CPT | Performed by: NURSE PRACTITIONER

## 2025-02-06 PROCEDURE — 3079F DIAST BP 80-89 MM HG: CPT | Performed by: NURSE PRACTITIONER

## 2025-02-06 PROCEDURE — 3074F SYST BP LT 130 MM HG: CPT | Performed by: NURSE PRACTITIONER

## 2025-02-06 NOTE — PROGRESS NOTES
HISTORY OF PRESENT ILLNESS  Chief Complaint   Patient presents with    Weight Problem     Recommendation from PCP , never try meds and open for med.Not interest in getting surgery       Shruthi Mina is a 59 year old female new to our office today for initiation of medical weight loss program.  Patient presents today with c/o excess weight. Referred by, PCP    Has been struggling weight gain after 2nd son (after 1994) and then each one after...   Admits to high sugar intake  +stress/anxiety eating, fast food, lack of food preparation   Eating outside the home 10 + times per week   Diet soda 6-8 cans per day  Gained about #60 lbs in the past 2 years- went through a break up... lacks motivation   Hasn't been exercising     Denies chest pain, shortness of breath, dizziness, blurred vision, headache, paresthesia, nausea/vomiting.     Reason/goal for weight loss: Lose as much weight as is healthy to do so in 6 months and Lose 80 pounds in 1 year   Triggers for weight gain? Stress, Eating out, and Fast food  Previous weight loss programs? YES:  Weight Watchers and fast metabolism diet  Previous weight loss medications?  none    Reviewed [facility] patient contract: Readiness for Lifestyle change: 10/10, Interest in Medication: 10/10, Bariatric surgery interest: 0/10    Weight  Starting weight: 253  Max weight: 260  Lowest weight: 170    Wt Readings from Last 6 Encounters:   02/06/25 253 lb (114.8 kg)   01/23/25 260 lb 6.4 oz (118.1 kg)   05/09/24 247 lb 12.8 oz (112.4 kg)   10/19/23 229 lb 6.4 oz (104.1 kg)   12/30/22 210 lb (95.3 kg)   11/30/22 226 lb (102.5 kg)        Typical diet   Breakfast Lunch Dinner Snacks Fluids   9am, eggs, toast, avodado (but often donuts, which I need to stop)  usually fast food, which I need to stop  chicken, rice, vegetables  usually junk/sweets (which I need to stop)  Water: adequate   Soda: +diet soda- 6-8 per day  Juice:  Coffee/Tea:     Portion: medium   Eats 3 meals per day: yes   Number of  restaurant or fast food meals/week: 10 meals/week    Social hx and lifestyle reviewed:    Work: [employer] - working 2 full time jobs  Marital status: not , lives with sons   Support: yes  Tobacco use: none  ETOH use: 0  per/week  Supplements: none  Exercise: none, just joined [employer]   Stress level: 8/10  Sleep hours and integrity: 9 Hours per night    MEDICAL HISTORY  PMH reviewed:   Cardiac disorders:negative   Depression/anxiety: anxiety  Glaucoma: negative  Kidney stones: negative  Eating disorder: negative  Migraines/seizures: negative  Joint-related conditions: negative  Liver disease: negative  Thyroid disease: hypothyroid- no meds  Constipation: negative  Diabetes: negative  Sleep Apnea hx: JOSSELINE- uses CPAP   Cancer hx: negative  DVT: negative  Family or personal history of Pancreatic issues / Medullary Thyroid Cancer: negative  History of bariatric surgery: negative    FMH reviewed: obesity in parent/s or sibling: sister,     REVIEW OF SYSTEMS  GENERAL: feels well otherwise, and negative fatigue   SKIN: denies any rashes to skin folds  HEENT: snoring- yes; denies neck thickening  LUNGS: denies shortness of breath with exertion, no apnea  CARDIOVASCULAR: denies chest pain on exertion, denies palpitations or pedal edema  GI: denies abdominal pain, distention, No N/V/D/C  MUSCULOSKELETAL: denies back pain, joint pains   NEURO: denies headaches or dizziness  ENDOCRINE: denies any excess hunger, urination or thirst, denies any purple striae  PSYCH: denies change in behavior or mood, anxiety     EXAM  /80   Pulse 80   Resp 16   Ht 5' 9\" (1.753 m)   Wt 253 lb (114.8 kg)   BMI 37.36 kg/m² , Percent body fat: F >32% Female 55%      GENERAL: well developed, well nourished, in no apparent distress  SKIN: warm, pink, dry without rashes to exposed area   EYES: conjunctiva pink, sclera non icteric, PERRLA  HEENT: atraumatic, normocephalic, O/p: Mallampati score- 2  NECK: supple, non tender, no  adenopathy, no thyromegaly  LUNGS: CTA in all fields, breathing non labored  CARDIO: RRR without murmur, normal S1 and S2 without clicks or gallops, no pedal edema  GI: +BS, soft, no masses, HSM or tenderness  EXTREMITIES: grossly intact  NEURO: Oriented times three, full ROM of bilateral UE/LE  PSYCH: pleasant, cooperative, normal mood and affect    Lab Results   Component Value Date    GLU 82 04/05/2022    BUN 17.0 04/05/2022    BUNCREA 14.0 04/05/2022    CREATSERUM 1.22 (H) 04/05/2022    ANIONGAP 4 08/23/2021    GFR 81 10/17/2015    GFRNAA 44 (L) 08/23/2021    GFRAA 51 (L) 08/23/2021    CA 9.7 04/05/2022    OSMOCALC 291 08/23/2021    ALKPHO 91 04/05/2022    AST 18 04/05/2022    ALT 25 04/05/2022    BILT 0.28 04/05/2022    TP 7.0 04/05/2022    ALB 4.5 04/05/2022    GLOBULIN 3.6 08/23/2021    AGRATIO 1.5 01/10/2011     04/05/2022    K 4.42 04/05/2022     04/05/2022    CO2 29.3 (H) 04/05/2022     No results found for: \"EAG\", \"A1C\"  Lab Results   Component Value Date    CHOLEST 243 (H) 08/23/2021    TRIG 195 (H) 08/23/2021    HDL 48 08/23/2021     (H) 08/23/2021    VLDL 38 (H) 08/23/2021    NONHDLC 195 (H) 08/23/2021     Lab Results   Component Value Date    B12 541 10/26/2021     Lab Results   Component Value Date    VITD 35.79 10/26/2021       Medications Ordered Prior to Encounter[1]    ASSESSMENT/PLAN    ICD-10-CM    1. Therapeutic drug monitoring  Z51.81         Initial Weight Data and Goal Weight Loss:  Weight Calculations  Initial Weight: 253 lbs  Initial Weight Date: 02/06/25  Today's Weight: 253 lbs  5% Goal: 12.65  10% Goal: 25.3  Total Weight Loss: 0 lbs  Initial consult: 253 lbs on 2/2025, Down  Lb:  lbs total     PLAN   Visualized outside Ekg in office today 02/06/21 shows NSR  Will start medications: Will trial wegovy 0.25mg weekly X 4 weeks (sample)  denies any personal or family history of pancreatitis, pancreatic cancer, thyroid cancer, MEN2   Is going to check for coverage for  GLP-1 meds (ie. Wegovy or zepbound)   --advised of side effects and adverse effects of this medication  Contradictions: none  Body composition test results given   Reviewed labs  HLD  uncontrolled, elevated total, LDL and trigy),  follows with PCP  JOSSELINE- continue with CPAP machine  Reduce eating out, pre-made meal options given   Reduce soda intake  Anxiety/depression, lack of motivation- referral to behavioral psych   Hypothyroid- stable, continue with current medication regimen, managed by PCP   Decrease carbs, increase protein, no skipping meals   Needs to incorporate exercise regimen FITTE: ACSM recommendations (150-300 minutes/ week in active weight loss)   Follow up with dietitian and psychologist as recommended.  Discussed the role of sleep and stress in weight management.  Counseled on comprehensive weight loss plan including attention to nutrition, exercise and behavior/stress management for success. See patient instruction below for more details.    Total time spent on chart review, pre-charting, obtaining history, counseling, and educating, reviewing labs was 50 minutes.       NOTE TO PATIENT: The 21st Century Cures Act makes clinical notes like these available to patients in the interest of transparency. Clinical notes are medical documents used by physicians and care providers to communicate with each other. These documents include medical language and terminology, abbreviations, and treatment information that may sound technical and at times possibly unfamiliar. In addition, at times, the verbiage may appear blunt or direct. These documents are one tool providers use to communicate relevant information and clinical opinions of the care providers in a way that allows common understanding of the clinical context.     Weight Loss Contract reviewed and signed today 2/6/2025    There are no Patient Instructions on file for this visit.    No follow-ups on file.    Patient verbalizes understanding.    Kala  AAMIR Ko           [1]   Current Outpatient Medications on File Prior to Visit   Medication Sig Dispense Refill    venlafaxine 75 MG Oral Tab Take 1 tablet (75 mg total) by mouth daily.      clotrimazole-betamethasone 1-0.05 % External Cream Apply 1 Application topically 2 (two) times daily as needed. 1 each 1    ALPRAZolam 0.5 MG Oral Tab Take 1 tablet (0.5 mg total) by mouth daily as needed. 20 tablet 0    FLUoxetine 20 MG Oral Cap Take 1 capsule (20 mg total) by mouth daily. 90 capsule 1     No current facility-administered medications on file prior to visit.

## 2025-02-06 NOTE — PATIENT INSTRUCTIONS
Welcome to the [facility]!!  Thank you for placing your trust in our health care team, I look forward to working with you along this journey to better health!  Next steps:     1.  Schedule a personal nutrition consultation with one of our registered dieticians. Bring along your food journal (3 days minimum). See journal options below.  2.  Fill your prescribed medication and take as discussed and prescribed: wegovy 0.25mg weekly x 4 weeks   Check with insurance on coverage for GLP-1 (ie. Wegovy or zepbound). And Zepbound- new indication for sleep apnea  3. Try out some pre-made meal options: neo hammer MD, metabolic meals, Factor 75, Freshly       Please try to work on the following dietary changes this first month:  Daily protein recommendation to start:  grams  Daily carbohydrate: <140g  Daily calories: 1,700-1,800  1.  Drink water with meals and throughout the day, cut down on soda and/or juice if consumed. Consider flavored water options like Bubbly, Spindrift, Hint and Abigail.  2.  Eat breakfast daily and focus on having protein with each meal, examples include: greek yogurt, cottage cheese, hard boiled egg, whole grain toast with peanut butter.   3.  Reduce refined carbohydrates and sugars which includes items such as sweets, as well as rice, pasta, and bread and make sure to choose whole grain options when having them with just 1 serving per meal about the size of your inner palm.  4.  Consume non starchy veggies daily working towards making them a good 50% of your daily food intake. Add them to lunch and dinner consistently.  5.  Optional can start a daily probiotic: VSL#3, (order on line at www.vsl3.com). Take 1 capsule daily with water for 30 days, then reduce to 1 every other day (this will reduce the cost). Capsules can be left out for 2 weeks, but then must be refrigerated.      Please download nuria My Fitness Pal, LoseIt! Or Net Diary to monitor daily  dietary intake and you will be able to see if you are eating the right amount of calories or too much or too little which would hinder weight loss. Additionally this will help to see your daily carbohydrate and protein intake. When you set the michael up choose 1-2 lbs/week as a goal.  Keeping a paper food journal is an option as well to remain accountable for your choices- this is the start to mindful eating! A low calorie diet has been consistently shown to support weight loss.     Continue or start exercising to help establish a routine. If not already exercising begin with 1 day and progress as able with long-term goal of 30 minutes 5 days a week at a minimum.     Meditation daily can help manage and control stress. Chronic stress can make weight loss difficult.  Exercising is one way to help with stress, but meditation using the CALM Michael or another comparable alternative can be done in your home or place of work with little time commitment. This Michael can also help work on behavior change and improve sleep. Check out the segment under Calm Masterclass and listen to The 4 Pillars of Health. A great way to begin learning about the foundation of lifestyle with practical tips to use in your every day.     Check out www.yourweightmatters.org blog for continued daily support and education along this weight loss journey!    Patient Resources:     Personal Training/Fitness Classes/Health Coaching     [facility] @ [email or web address] Full fitness center with group fitness and personal training. Discount available as client of [facility].  Health Coaching and Personal Training with Katey Whiting at our [facility]- individual weekly coaching with option to add personal training and small group fitness classes targeted at weight loss- [phone] and/or email @ [email or web address]  [address] [place] [address]  [email or web address]. Group Fitness [phone] and/or email Sasha at [email or web address]  [facility] Fitness Centers with multiple locations: sellpoints (www.Electro Power Systems), Parcel The STEERads (www.Sapphire Innovation.LiquidPiston), Tapestry (www.Project Airplane), The Exercise  (www.exercisecoach.LiquidPiston)     Online Fitness  Fitness  on Utube  Fit in 10 DVD series- www.rweoz07VHY.com  Sit and Be Fit - Chair exercise series Www.sitandbefit.org  Hip Hop Fit with Jacob Sher at www.hiphopfit.net     Apps for on the Go Fitness  Palms 7 Minute Workout (orange box with white 7) - free on the go HIIT training michael  Peloton Michael @ www.onepeloton.com     Nutrition Trackers and Tools  LoseIT! And My Fitness Pal apps and on line for tracking nutrition  NOOM - virtual health coaching  FitFoundation (healthy meals on the go) in [place] @ wwwClarimedixdewabmwkyihcd5wCircle Plus Payments  Carmen PRYOR @ wwwClarimedixbistromd.com and Pdgbke39 (keto and low carb plans recommended) @ www.fwohhq50.com, Metabolic Meals @ www.MyMetabolicMeals.com - individual prepared meals to go  Gobble, Blue Apron, Home , Every Plate, Sunbasket- on line meal delivery programs for preparation at home  Meal Village in [place] for homemade meals to go @ www.mealMilaage.LiquidPiston  Diet Doctor @ www.dietdoctor.com - low carb swaps  Yummly - meal prep and planning michael (www.yummly.com)     Stress Management/Behavior/Mindful Eating  CALM meditation michael (www.calm.com)  Headspace  Am I Hungry? Mindful eating virtual  michael  Www.yourweightmatters.org - Obesity Action Coalition sponsored Blog posts daily  Motivation michael (black box with white \")- daily supportive messages sent to your phone     Books/Video Education/Podcasts  Mindless Eating by Marcelo Zambrano  Why We Get Sick by Silvino Bah (a book about insulin resistance)  Atomic Habits by Jamel Sawyer (a book about taking small steps to promote greater behavior change)   Can't Hurt Me by Artemio  Elenita (a book exploring the power of discipline in achieving your goals)  The End of Dieting: How to Live for Life by Dr. Balaji Warren M.D. or listen to The Targovax Podcast Episode 63: Understanding \"Nutritarian\" Eating w/Dr. Balaji Warren  Your Body in Balance: The New Science of Food, Hormones, and Health by Dr. Stone Starks  The Menopause Diet Plan by Petrona Ellis and Val Danielle  The Complete Guide to fasting by Dr. Salas  Sugar, Salt & Fat by July Abraham, Ph.D, R.D.  Weight Loss Surgery Will Not Treat Food Addiction by Shanice Lizama Ph.D  The Game Changers- [employer] Documentary on plant based nutrition  Fed Up - documentary about obesity (Free on Utube)  The Truth About Sugar - documentary on sugar (Free on Shipzi, https://youtu.be/5M0epxdGF3t)  The Dr. Reynolds T5 Wellness Plan by Dr. Berlin Reynolds MD  Fitlosophy Fitspiration - journal to better health (found at [employer] in fitness aisle)  What Happened to You?- a look at the impact trauma has on behavior written by Ghassan Jacob and Dr. Samir Frazier  Whole Again by Osman Perdomo - discovering your true self after trauma  Ede Ambriz talk on [employer], The Call to Courage  Podcasts: The Exam Room by the Physician's Committee, Nutrition Facts by Dr. Adrian    We are here to support you with weight loss, but please remember that you still need your primary care provider for your routine health maintenance.

## 2025-02-10 DIAGNOSIS — F41.9 ANXIETY: ICD-10-CM

## 2025-02-11 ENCOUNTER — TELEPHONE (OUTPATIENT)
Dept: INTERNAL MEDICINE CLINIC | Facility: CLINIC | Age: 60
End: 2025-02-11

## 2025-02-11 NOTE — TELEPHONE ENCOUNTER
I called patient back.  She is on wegovy 0.25 mg from sample.  She has incredible appetite control - she is very happy. She wanted to know about the thyroid cancer risk.  I explained that the study was for specific cancer - medullary thyroid cancer which is hereditary and if she has it or another family member does - she would not want to use wegovy.   She also wanted to know how to progress with dosing and I  Explained that the lower doses are temporary/titration and she may be able to repeat for another month but most insurance wants you to move up until you reach maintenance doses (1.7 mg or 2.4 mg).  She is going to reach out after shot #2 to see if she wants to stay on 0.25 mg and then we can send that order and apply for coverage

## 2025-02-11 NOTE — TELEPHONE ENCOUNTER
Patient LVM she would like to talk to AMF about wegovy - she has a few questions  It is covered but better as 90 day (wegovy)

## 2025-02-13 RX ORDER — ALPRAZOLAM 0.5 MG
0.5 TABLET ORAL
Qty: 20 TABLET | Refills: 0 | Status: SHIPPED | OUTPATIENT
Start: 2025-02-13

## 2025-02-13 NOTE — TELEPHONE ENCOUNTER
Please review; protocol failed/ has no protocol      Recent fills: 12/30/2024,11/26/2024,11/05/2024  Last Rx written: 12/30/2024  Last Office Visit: 01/23/2025    Recent Visits  Date Type Provider Dept   01/23/25 Office Visit Demetris Márquez MD [facility] [address] [place] [address]         Requested Prescriptions   Pending Prescriptions Disp Refills    ALPRAZOLAM 0.5 MG Oral Tab [Pharmacy Med Name: ALPRAZOLAM 0.5MG TABLETS] 20 tablet 0     Sig: TAKE 1 TABLET(0.5 MG) BY MOUTH DAILY AS NEEDED       Controlled Substance Medication Failed - 2/13/2025  3:51 PM        Failed - This medication is a controlled substance - forward to provider to refill        Passed - Medication is active on med list           Recent Outpatient Visits              1 week ago Therapeutic drug monitoring    [facility], [address] [place] [address] Kala Cat APRN    Office Visit    3 weeks ago Routine general medical examination at a health care facility    [address] Demetris Márquez MD    Office Visit    8 months ago Seborrheic keratoses    [facility] Alexis Archuleta MD    Office Visit    9 months ago Anxiety    [address] Demetris Márquez MD    Office Visit    1 year ago Anxiety    [address] [place] [address] Demetris Márquez MD    Office Visit          Future Appointments         Provider Department Appt Notes    In 4 months Kala Cat APRN [address]     In 6 months Kala Cat APRN [address]     In 9 months Kala Cat APRN [address]

## 2025-02-17 NOTE — TELEPHONE ENCOUNTER
PT left a VM stating she wants to move up and that she has taken her 2nd shot of wegovy 0.25 and is doing well. PT also states that wegovy is covered under insurance with a PA.     Requesting   Requested Prescriptions     Pending Prescriptions Disp Refills    semaglutide-weight management 0.5 MG/0.5ML Subcutaneous Solution Auto-injector 2 mL 0     Sig: Inject 0.5 mL (0.5 mg total) into the skin once a week for 4 doses.      LOV: 02/06/25  RTC: 06/27/25  Filled: 02/06/25 #2 with 0 refills-Sample from in office     Future Appointments   Date Time Provider Department Center   6/27/2025  9:20 AM Kala Cat APRN [facility]   9/9/2025  1:20 PM Kala Cat APRN [facility]   12/1/2025  9:00 AM Kala Cat APRN [facility]

## 2025-02-20 ENCOUNTER — TELEPHONE (OUTPATIENT)
Dept: INTERNAL MEDICINE CLINIC | Facility: CLINIC | Age: 60
End: 2025-02-20

## 2025-02-20 NOTE — TELEPHONE ENCOUNTER
PA needed for Wegovy 0.5 mg  [employer]   ID [id number]    Entered PA for wegovy 0.5 mg in epic today  Attached her note.  Pending approval or denial

## 2025-02-25 ENCOUNTER — HOSPITAL ENCOUNTER (OUTPATIENT)
Dept: MAMMOGRAPHY | Age: 60
Discharge: HOME | End: 2025-02-25
Attending: INTERNAL MEDICINE
Payer: COMMERCIAL

## 2025-02-25 ENCOUNTER — PATIENT MESSAGE (OUTPATIENT)
Dept: INTERNAL MEDICINE CLINIC | Facility: CLINIC | Age: 60
End: 2025-02-25

## 2025-02-25 DIAGNOSIS — Z12.31 ENCOUNTER FOR SCREENING MAMMOGRAM FOR MALIGNANT NEOPLASM OF BREAST: ICD-10-CM

## 2025-02-25 PROCEDURE — 77063 BREAST TOMOSYNTHESIS BI: CPT | Performed by: INTERNAL MEDICINE

## 2025-02-25 PROCEDURE — 77067 SCR MAMMO BI INCL CAD: CPT | Performed by: INTERNAL MEDICINE

## 2025-03-07 ENCOUNTER — PATIENT MESSAGE (OUTPATIENT)
Dept: INTERNAL MEDICINE CLINIC | Facility: CLINIC | Age: 60
End: 2025-03-07

## 2025-03-07 NOTE — TELEPHONE ENCOUNTER
Dr. Márquez said he can start fulfilling my RX for:     Venlafaxine Hydrochloride, Extended Release, 75mg capsule  Fluoxetine 20mg capsule     I used to get these from another provider.     I need refills.  Can you call them in?  Then in the future, Sam will call you for further refills?     I prefer to get a 90 day supply, as it keeps costs low.     Thanks!  Shruthi Mina

## 2025-03-10 ENCOUNTER — LAB ENCOUNTER (OUTPATIENT)
Dept: LAB | Age: 60
End: 2025-03-10
Attending: INTERNAL MEDICINE
Payer: COMMERCIAL

## 2025-03-10 DIAGNOSIS — Z51.81 THERAPEUTIC DRUG MONITORING: ICD-10-CM

## 2025-03-10 DIAGNOSIS — F41.9 ANXIETY: ICD-10-CM

## 2025-03-10 DIAGNOSIS — Z13.0 SCREENING FOR DISORDER OF BLOOD AND BLOOD-FORMING ORGANS: ICD-10-CM

## 2025-03-10 DIAGNOSIS — Z13.228 SCREENING FOR METABOLIC DISORDER: ICD-10-CM

## 2025-03-10 DIAGNOSIS — Z13.220 SCREENING FOR LIPID DISORDERS: ICD-10-CM

## 2025-03-10 DIAGNOSIS — Z00.00 ROUTINE GENERAL MEDICAL EXAMINATION AT A HEALTH CARE FACILITY: ICD-10-CM

## 2025-03-10 DIAGNOSIS — E66.812 CLASS 2 OBESITY: ICD-10-CM

## 2025-03-10 DIAGNOSIS — Z13.29 SCREENING FOR THYROID DISORDER: ICD-10-CM

## 2025-03-10 LAB
ALBUMIN SERPL-MCNC: 4.5 G/DL (ref 3.2–4.8)
ALBUMIN/GLOB SERPL: 1.6 {RATIO} (ref 1–2)
ALP LIVER SERPL-CCNC: 90 U/L
ALT SERPL-CCNC: 77 U/L
ANION GAP SERPL CALC-SCNC: 11 MMOL/L (ref 0–18)
AST SERPL-CCNC: 41 U/L (ref ?–34)
BASOPHILS # BLD AUTO: 0.07 X10(3) UL (ref 0–0.2)
BASOPHILS NFR BLD AUTO: 1.1 %
BILIRUB SERPL-MCNC: 0.5 MG/DL (ref 0.2–1.1)
BUN BLD-MCNC: 13 MG/DL (ref 9–23)
CALCIUM BLD-MCNC: 9.3 MG/DL (ref 8.7–10.6)
CHLORIDE SERPL-SCNC: 105 MMOL/L (ref 98–112)
CHOLEST SERPL-MCNC: 229 MG/DL (ref ?–200)
CO2 SERPL-SCNC: 26 MMOL/L (ref 21–32)
CREAT BLD-MCNC: 1.44 MG/DL
EGFRCR SERPLBLD CKD-EPI 2021: 42 ML/MIN/1.73M2 (ref 60–?)
EOSINOPHIL # BLD AUTO: 0.15 X10(3) UL (ref 0–0.7)
EOSINOPHIL NFR BLD AUTO: 2.3 %
ERYTHROCYTE [DISTWIDTH] IN BLOOD BY AUTOMATED COUNT: 11.9 %
EST. AVERAGE GLUCOSE BLD GHB EST-MCNC: 111 MG/DL (ref 68–126)
FASTING PATIENT LIPID ANSWER: YES
FASTING STATUS PATIENT QL REPORTED: YES
GLOBULIN PLAS-MCNC: 2.8 G/DL (ref 2–3.5)
GLUCOSE BLD-MCNC: 82 MG/DL (ref 70–99)
HBA1C MFR BLD: 5.5 % (ref ?–5.7)
HCT VFR BLD AUTO: 46.3 %
HDLC SERPL-MCNC: 34 MG/DL (ref 40–59)
HGB BLD-MCNC: 15.5 G/DL
IMM GRANULOCYTES # BLD AUTO: 0.02 X10(3) UL (ref 0–1)
IMM GRANULOCYTES NFR BLD: 0.3 %
LDLC SERPL CALC-MCNC: 166 MG/DL (ref ?–100)
LYMPHOCYTES # BLD AUTO: 2.37 X10(3) UL (ref 1–4)
LYMPHOCYTES NFR BLD AUTO: 36.1 %
MCH RBC QN AUTO: 29.8 PG (ref 26–34)
MCHC RBC AUTO-ENTMCNC: 33.5 G/DL (ref 31–37)
MCV RBC AUTO: 89 FL
MONOCYTES # BLD AUTO: 0.52 X10(3) UL (ref 0.1–1)
MONOCYTES NFR BLD AUTO: 7.9 %
NEUTROPHILS # BLD AUTO: 3.44 X10 (3) UL (ref 1.5–7.7)
NEUTROPHILS # BLD AUTO: 3.44 X10(3) UL (ref 1.5–7.7)
NEUTROPHILS NFR BLD AUTO: 52.3 %
NONHDLC SERPL-MCNC: 195 MG/DL (ref ?–130)
OSMOLALITY SERPL CALC.SUM OF ELEC: 293 MOSM/KG (ref 275–295)
PLATELET # BLD AUTO: 317 10(3)UL (ref 150–450)
POTASSIUM SERPL-SCNC: 4.1 MMOL/L (ref 3.5–5.1)
PROT SERPL-MCNC: 7.3 G/DL (ref 5.7–8.2)
RBC # BLD AUTO: 5.2 X10(6)UL
SODIUM SERPL-SCNC: 142 MMOL/L (ref 136–145)
TRIGL SERPL-MCNC: 157 MG/DL (ref 30–149)
TSI SER-ACNC: 2.18 UIU/ML (ref 0.55–4.78)
VLDLC SERPL CALC-MCNC: 31 MG/DL (ref 0–30)
WBC # BLD AUTO: 6.6 X10(3) UL (ref 4–11)

## 2025-03-10 PROCEDURE — 80050 GENERAL HEALTH PANEL: CPT | Performed by: INTERNAL MEDICINE

## 2025-03-10 PROCEDURE — 82306 VITAMIN D 25 HYDROXY: CPT | Performed by: NURSE PRACTITIONER

## 2025-03-10 PROCEDURE — 83036 HEMOGLOBIN GLYCOSYLATED A1C: CPT | Performed by: NURSE PRACTITIONER

## 2025-03-10 PROCEDURE — 80061 LIPID PANEL: CPT | Performed by: INTERNAL MEDICINE

## 2025-03-11 LAB — VIT D+METAB SERPL-MCNC: 17.2 NG/ML (ref 30–100)

## 2025-03-11 RX ORDER — VENLAFAXINE 75 MG/1
75 TABLET ORAL DAILY
Qty: 90 TABLET | Refills: 2 | Status: SHIPPED | OUTPATIENT
Start: 2025-03-11

## 2025-03-11 NOTE — TELEPHONE ENCOUNTER
Refill passed [facility] protocol.     Medium Duplicate Therapy: FLUoxetine, venlafaxineSSRIS AND SNRIS. No Abuse/Dependency Potential.    Per provider LAST OFFICE VISIT 01/23/2025:   Anxiety and depression:  Improved with current management: Continue fluoxetine 20 mg daily and venlafaxine 75 mg daily.  Taper and discontinue mirtazapine over the next 10 to 14 days  I agreed to assume prescribing of these medications and recommended 6-month follow-up, or sooner if acute concerns warrant

## 2025-03-11 NOTE — PROGRESS NOTES
A1c 5.5% is normal   Low vitamin D level, please start taking ergocalciferol 50,000 U 2 times per week x 16 weeks (please order #32 no refill). Then start daily maintenance vitamin D 2000 Units

## 2025-03-12 RX ORDER — ATORVASTATIN CALCIUM 10 MG/1
10 TABLET, FILM COATED ORAL NIGHTLY
Qty: 90 TABLET | Refills: 0 | Status: SHIPPED | OUTPATIENT
Start: 2025-03-12

## 2025-03-13 RX ORDER — ALPRAZOLAM 0.5 MG
0.5 TABLET ORAL
Qty: 20 TABLET | Refills: 0 | Status: SHIPPED | OUTPATIENT
Start: 2025-03-13

## 2025-03-13 NOTE — TELEPHONE ENCOUNTER
Please review; protocol failed/ has no protocol      Recent fills: 02/13/2025,12/30/2024,11/26/2024  Last Rx written: 02/13/2025  Last Office Visit: 01/23/2025    Recent Visits  Date Type Provider Dept   01/23/25 Office Visit Demetris Márquez MD [facility] [address]

## 2025-03-14 ENCOUNTER — HOSPITAL ENCOUNTER (OUTPATIENT)
Dept: MAMMOGRAPHY | Facility: HOSPITAL | Age: 60
Discharge: HOME | End: 2025-03-14
Attending: INTERNAL MEDICINE
Payer: COMMERCIAL

## 2025-03-14 DIAGNOSIS — R92.2 INCONCLUSIVE MAMMOGRAM: ICD-10-CM

## 2025-03-14 PROCEDURE — 77061 BREAST TOMOSYNTHESIS UNI: CPT | Performed by: INTERNAL MEDICINE

## 2025-03-14 PROCEDURE — 77065 DX MAMMO INCL CAD UNI: CPT | Performed by: INTERNAL MEDICINE

## 2025-03-14 PROCEDURE — 76642 ULTRASOUND BREAST LIMITED: CPT | Performed by: INTERNAL MEDICINE

## 2025-04-07 ENCOUNTER — TELEPHONE (OUTPATIENT)
Dept: INTERNAL MEDICINE CLINIC | Facility: CLINIC | Age: 60
End: 2025-04-07

## 2025-04-07 RX ORDER — VENLAFAXINE HYDROCHLORIDE 37.5 MG/1
37.5 CAPSULE, EXTENDED RELEASE ORAL DAILY
Qty: 90 CAPSULE | Refills: 3 | Status: SHIPPED | OUTPATIENT
Start: 2025-04-07

## 2025-04-07 NOTE — TELEPHONE ENCOUNTER
Received call from pharmacist at [facility]. They got script for venlafaxine ER 75 mg, but script says to take half tab (37.5 mg). Patient unable to spit given it is ER. Venlafaxine does come in 37.5 tabs or capsules, capsules generally cheaper.     Dr. Márquez- venlafaxine ER 37.5 mg capsules pended if agreeable.

## 2025-04-10 ENCOUNTER — PATIENT MESSAGE (OUTPATIENT)
Dept: INTERNAL MEDICINE CLINIC | Facility: CLINIC | Age: 60
End: 2025-04-10

## 2025-04-10 DIAGNOSIS — F41.9 ANXIETY: ICD-10-CM

## 2025-04-10 DIAGNOSIS — E66.812 CLASS 2 OBESITY: Primary | ICD-10-CM

## 2025-04-11 RX ORDER — SEMAGLUTIDE 0.5 MG/.5ML
INJECTION, SOLUTION SUBCUTANEOUS
Qty: 2 ML | Refills: 1 | Status: SHIPPED | OUTPATIENT
Start: 2025-04-11

## 2025-04-11 NOTE — TELEPHONE ENCOUNTER
Requesting wegovy increase  LOV: 2/6/25  RTC: 4 months  Last Relevant Labs: na  Filled: 2/17/25 #2ml with 1 refills  0.5 mg dose    Future Appointments   Date Time Provider Department Center   6/27/2025  9:20 AM Kala Cat APRN [facility]   9/9/2025  1:20 PM Kala Cat APRN [facility]   12/1/2025  9:00 AM Kala Cat APRN [facility]     2/6/25 253#

## 2025-04-11 NOTE — TELEPHONE ENCOUNTER
Requesting   Requested Prescriptions     Pending Prescriptions Disp Refills    WEGOVY 0.5 MG/0.5ML Subcutaneous Solution Auto-injector [Pharmacy Med Name: WEGOVY 0.5MG/0.5ML INJ (4 PENS)] 2 mL 1     Sig: ADMINISTER 0.5 MG UNDER THE SKIN 1 TIME A WEEK      LOV: 2/06/25  RTC: 6/27/2025  Filled: 2/17/2025 #2 ml with 1 refills    Future Appointments   Date Time Provider Department Center   6/27/2025  9:20 AM Kala Cat APRN [facility]   9/9/2025  1:20 PM Kala Cat APRN [facility]   12/1/2025  9:00 AM Kala Cat APRN [facility]

## 2025-04-15 RX ORDER — ALPRAZOLAM 0.5 MG
0.5 TABLET ORAL
Qty: 20 TABLET | Refills: 0 | Status: SHIPPED | OUTPATIENT
Start: 2025-04-15

## 2025-04-15 NOTE — TELEPHONE ENCOUNTER
Please review. Refill failed protocol.     Recent fills each # 20 : 03/13/2025 , 02/13/2025 , 12/30/2024  Last prescription written: 03/13/2025  Last office visit:  1/23/2025    Future Appointments   Date Time Provider Department Center   6/27/2025  9:20 AM Kala Cat APRN [facility]   9/9/2025  1:20 PM Kala Cat APRN [facility]   12/1/2025  9:00 AM Kala Cat APRN [facility]

## 2025-05-04 ENCOUNTER — PATIENT MESSAGE (OUTPATIENT)
Dept: INTERNAL MEDICINE CLINIC | Facility: CLINIC | Age: 60
End: 2025-05-04

## 2025-05-04 DIAGNOSIS — F41.9 ANXIETY: ICD-10-CM

## 2025-05-04 DIAGNOSIS — F32.A DEPRESSION, UNSPECIFIED DEPRESSION TYPE: Primary | ICD-10-CM

## 2025-05-06 RX ORDER — VENLAFAXINE HYDROCHLORIDE 75 MG/1
75 CAPSULE, EXTENDED RELEASE ORAL DAILY
Qty: 90 CAPSULE | Refills: 3 | Status: SHIPPED | OUTPATIENT
Start: 2025-05-06

## 2025-05-06 NOTE — TELEPHONE ENCOUNTER
Dr. Márquez- Pended order with dosage update, please review and sign if agreeable     LOV 1/23/25     Medication Detail    Medication Quantity Refills Start End   venlafaxine ER 37.5 MG Oral Capsule SR 24 Hr 90 capsule 3 4/7/2025 --   Sig:   Take 1 capsule (37.5 mg total) by mouth daily.     Route:   Oral     Order #:   [id number]

## 2025-05-06 NOTE — TELEPHONE ENCOUNTER
See 4/4/25 Patient Message encounter.  Pt requesting Rx refill Venlafaxine HCl ER 75 MG Oral Tablet 24 Hr

## 2025-05-08 DIAGNOSIS — F41.9 ANXIETY: ICD-10-CM

## 2025-05-08 RX ORDER — ALPRAZOLAM 0.5 MG
0.5 TABLET ORAL
Qty: 20 TABLET | Refills: 0 | Status: SHIPPED | OUTPATIENT
Start: 2025-05-08

## 2025-05-08 NOTE — TELEPHONE ENCOUNTER
Please review. Refill failed protocol.     Recent fills each # 20 : 04/15/25 , 03/13/25 , 02/13/25  Last prescription written: 04/15/25  Last office visit:  1/23/2025    Future Appointments   Date Time Provider Department Center   6/27/2025  9:20 AM Kala Cat APRN [facility]   9/9/2025  1:20 PM Kala Cat APRN [facility]   12/1/2025  9:00 AM Kala Cat APRN [facility]

## 2025-05-18 ENCOUNTER — PATIENT MESSAGE (OUTPATIENT)
Dept: INTERNAL MEDICINE CLINIC | Facility: CLINIC | Age: 60
End: 2025-05-18

## 2025-05-18 DIAGNOSIS — E66.812 CLASS 2 OBESITY: Primary | ICD-10-CM

## 2025-05-18 DIAGNOSIS — G47.33 OSA ON CPAP: ICD-10-CM

## 2025-05-19 NOTE — TELEPHONE ENCOUNTER
AMF-please advise    Spoke with patient via phone for further triage.  Patient states \"since starting Wegovy 1mg she has felt nauseated- but doesn't vomit, and it is worse the first few days after the shot, but continues throughout the week\".  Patient states \"I can only force down a few bits of food every couple of hours\".  Patient states \"it was not like this with Wegovy 0.5mg dose\".  Patient aware she needs to eat to help minimize side-effects, but is having trouble getting food in.

## 2025-05-22 ENCOUNTER — TELEPHONE (OUTPATIENT)
Dept: INTERNAL MEDICINE CLINIC | Facility: CLINIC | Age: 60
End: 2025-05-22

## 2025-05-22 NOTE — TELEPHONE ENCOUNTER
PA needed for wegovy 0.5 mg dose (she dropped down d/t illness)  Call   ID  [phone]    I called [employer] to try to see about approving repeat of 0.5 mg dose    Class 2 obesity E66.812    Anxiety F41.9    Hypothyroidism, unspecified type E03.9    JOSSELINE on CPAP G47.33    Primary osteoarthritis of first carpometacarpal joint of left hand M18.12    Elevated cholesterol with elevated triglycerides E78.2      She has exceeded plan limits since going back on dose - they have to send quantity limit form to get approval

## 2025-05-27 NOTE — TELEPHONE ENCOUNTER
CAN BE DONE ON CMM  KEY  [id number]  Pa entered on CMM for repeating dose of 0.5 mg wegovy today  Noted she could not eat when increased to 1 mg dose - attached that message and her only visit note.  Pending approval or denial    OLAYINKA DEXTER ([place]: [id number])

## 2025-05-29 NOTE — TELEPHONE ENCOUNTER
Form on fax Wednesday at 9:39 asking for additional information for PA for wegovy  Need to print and complete

## 2025-06-03 DIAGNOSIS — F41.9 ANXIETY: ICD-10-CM

## 2025-06-03 NOTE — TELEPHONE ENCOUNTER
So mark - now have approval in faxes from Friday  That is good 4/30/25 to 5/30/2026    CASE ID   [id number]  Wegovy 0.4 mg from 4/30/25 to 5/30/2026

## 2025-06-03 NOTE — TELEPHONE ENCOUNTER
[employer] will not allow coverage of repeating dose of 0.5 mg wegovy.  MA filled out quantity limit form since she got sick on higher dose.  She is only allowed 8 pens per 180 days of this dose

## 2025-06-04 RX ORDER — ALPRAZOLAM 0.5 MG
0.5 TABLET ORAL
Qty: 20 TABLET | Refills: 0 | Status: SHIPPED | OUTPATIENT
Start: 2025-06-04

## 2025-06-04 NOTE — TELEPHONE ENCOUNTER
Please review. Protocol Failed; No Protocol        Recent fills: 4/15/2025, 5/8/2025  Last Rx written: 5/8/2025  Last office visit: 1/23/2025

## 2025-06-25 DIAGNOSIS — F41.9 ANXIETY: ICD-10-CM

## 2025-06-26 RX ORDER — ALPRAZOLAM 0.5 MG
0.5 TABLET ORAL
Qty: 20 TABLET | Refills: 0 | Status: SHIPPED | OUTPATIENT
Start: 2025-06-26

## 2025-06-26 NOTE — TELEPHONE ENCOUNTER
Please review. Protocol Failed; No Protocol      Recent fills: 5/8/2025, 6/4/2025  Last Rx written: 6/4/2025  Last office visit: 1/23/2025

## 2025-06-27 ENCOUNTER — TELEMEDICINE (OUTPATIENT)
Dept: INTERNAL MEDICINE CLINIC | Facility: CLINIC | Age: 60
End: 2025-06-27
Payer: COMMERCIAL

## 2025-06-27 DIAGNOSIS — G47.33 OSA ON CPAP: ICD-10-CM

## 2025-06-27 DIAGNOSIS — Z51.81 THERAPEUTIC DRUG MONITORING: Primary | ICD-10-CM

## 2025-06-27 DIAGNOSIS — F41.9 ANXIETY: ICD-10-CM

## 2025-06-27 DIAGNOSIS — M18.12 PRIMARY OSTEOARTHRITIS OF FIRST CARPOMETACARPAL JOINT OF LEFT HAND: ICD-10-CM

## 2025-06-27 DIAGNOSIS — E78.2 ELEVATED CHOLESTEROL WITH ELEVATED TRIGLYCERIDES: ICD-10-CM

## 2025-06-27 DIAGNOSIS — E03.9 HYPOTHYROIDISM, UNSPECIFIED TYPE: ICD-10-CM

## 2025-06-27 DIAGNOSIS — E66.812 CLASS 2 OBESITY: ICD-10-CM

## 2025-06-27 PROCEDURE — 98005 SYNCH AUDIO-VIDEO EST LOW 20: CPT | Performed by: NURSE PRACTITIONER

## 2025-06-27 NOTE — PATIENT INSTRUCTIONS
Next steps:  1.  Fill your prescribed medication and take as discussed and prescribed: wegovy 0.5mg weekly  2.  Continue with meeting with dietitian as directed    3.  Use contraception at all times while on anti-obesity medications.     Please try to work on the following dietary changes:  Daily protein recommendation to start:  grams    1.  Drink water with meals and throughout the day, cut down on soda and/or juice if consumed. Consider flavored water options like Bubbly, Spindrift, Hint and Abigail. Reduce alcohol servings to 4 per week maximum.  2.  Have protein with each meal, examples include: greek yogurt, cottage cheese, hard boiled egg, tofu, chicken, fish, or tuna. Recommended daily protein intake is 100 grams/day.   3.  Work towards reducing/eliminating refined carbohydrates and sugars which includes items such as sweets, as well as rice, pasta, and bread and make sure to choose whole grain options when having them with just 1 serving per meal about the size of your inner palm.  4.  Consume non starchy veggies daily working towards making them a good 50% of your daily food intake. Add them to lunch and dinner consistently.  5.  Start a daily probiotic: VSL#3 is recommended, (order on line at www.vsl3.com). Take 1 capsule daily with water for 30 days, then reduce to 1 every other day (this will reduce the cost). Capsules can be left out of refrigerator for 2 weeks. I recommend using a pill box weekly and keeping the bottle in the fridge.     Please download nuria My Fitness Pal, LoseIt! Or My Net Diary to monitor daily dietary intake and you will be able to see if you are eating the right amount of calories or too much or too little which would hinder weight loss. Additionally this will help to see your daily carbohydrate and protein intake. When you set the nuria up choose 1.5 lbs/week as a goal.  Keeping a paper food journal is an option as well to remain accountable for your choices- this is the  start to mindful eating! A low calorie diet has been consistently shown to support weight loss.     Continue or start exercising to help establish a routine. If not already exercising begin with 1 day/week and progress as able with the goal of working towards 30 minutes 5 days a week at a minimum. A variety or cardio, strength and stretching is important. Review resources below to help support you in building this healthy routine.     Meditation daily can help manage and control stress. Chronic stress can make weight loss difficult.  Exercising is one way to help with stress, but meditation using the CALM Michael or another comparable alternative can be done in your home or place of work with little time commitment. This Michael can also help work on behavior change and improve sleep. Check out the segment under Calm Masterclass and listen to The 4 Pillars of Health. A great way to begin learning about the foundation of lifestyle with practical tips to use in your every day. In addition, we offer counseling services and support for individual connection and care. A referral is necessary so please let me know if this is a service you are interested.     Check out www.yourweightmatters.org blog for continued support and education along your weight loss journey to optimal health!  Patient Resources:     Personal Training/Fitness Classes/Health Coaching     [facility] in [place]: Full fitness center with group fitness and personal training located in [place].  Health Coaching with Obey Escobar, and Yo Chirinos at our [facility]- individual coaching to work on your health goals. Call [phone] and/or email @ [email or web address]. Free 60 minute consult when client of [facility].  BECKY French @ [email or web address]. A variety of group fitness options plus various yoga classes [phone] and/or email Sasha camacho  [email or web address] [place] [address].[facility]  [facility] with multiple locations: discoapi Fitness (www.DealsAndYou.Glocal), F45 Training (www.a02tysjqupc.Glocal), Hittite Microwave Body Bootcamp (www.BlockScorebodybootVedicisp.Glocal), [employer] (www.Lamiecco.Glocal), The Exercise  (www.exercisecoach.com), Club Pilates (www.clubpilates.Glocal)     Online Fitness  Fitness  on Utube  Fit in 10 DVD series                              www.smndz21BZTCallision  Chair exercises via Sit and Be Fit (www.sitandbefit.org) and Verenium (www.PerioSeal.com) or Abdoul Calhoun or Jay Luna videos on YouTube.  Hip Hop Fit with Jacob Sher at www.hiphopfit.[employer]     Apps for on the Go Fitness  [place] 7 Minute Workout (orange box with white 7) - free on the go HIIT training michael  Peloton Michael @ www.onepeloton.com     Nutrition Trackers, Meal Preparation, and Other Meal Programs  LoseIT! And My Fitness Pal apps and on line for tracking nutrition  NOOM - virtual health coaching  FitFoundation (healthy meals on the go) in [place] @ www.mnvavlnattpou4jCallision  Carmen PRYOR @ UmweltechbistrUSB Promos and Pauies42 (calorie smart and low carb plans recommended) @ www.vacmuw37.com, Metabolic Meals @ www.SquareknotMetabolicMeals.com - individual prepared meals to go  Gobble, Blue Apron, Home , Every Plate, Sunbasket- on line meal delivery programs for preparation at home  Meal Village in [place] for homemade meals to go @ www.mealvillage.com  Diet Doctor @ www.dietdoctor.com - low carb swaps  ReciMe and Mealime michael (grocery and meal planning)     Stress, Anxiety, Depression, Trauma  CALM meditation michael (www.calm.com)  Headspace  Don't let anxiety run your life. Using the science of emotion regulation and mindfulness to overcome fear and worry by Artemio Byrne PsyD and Scott Ying MA.  The [employer] Podcast (September 27, 2023): 6 Magic Words That Stop Anxiety  What Happened to You?- a look at the impact trauma has on behavior written by Ghassan  Nidia and Dr. Samir Fraizer  Whole Again by Osman Perdomo - discovering your true self after trauma     Mindful Eating/The Hungry Brain  Am I Hungry? Mindful eating virtual  nuria (www.amihungry.com)  The Hungry Brain by Radha Beatty, PhD  Mindless Eating by Marcelo Zambrano  Weight Loss Surgery Will Not Treat Food Addiction by Shanice Lizama Ph.D     Metabolic Dysfunction, Hormones and Cravings  Why We Get Sick? By Silvino Bah (insulin resistance)  Your Body in Balance: The New Science of Food, Hormones, and Health by Dr. Stone Starks  The Complete Guide to fasting by Dr. Salas  Fast Like a Girl by Dr. Sintia Mckeon  The M Factor (documentary on PBS about Menopause)  Sugar, Salt & Fat by July Abraham, Ph.D, R.D.  The Truth About Sugar - documentary on sugar (Free on Qoture, https://Modern Meadow.be/0B7dpplKA5b)  Presentation on SUGAR called Sugar: The Bitter Truth by Dr. Ellis Ferrari (Qoture) https://Modern [email or web address]/dBnniua6-oM?si=[email or web address]  Reverse Visceral Fat: #1 Way to Increase Your Lifespan & End Inflammation with Dr. Uriel Tom on Utube @ https://Modern Meadow.be/nupPRnvUpJY?si=[email or web address]     Nutrition Support  You Are What You Eat - Netfix series on twin study looking at impact of nutrition changes on health  The End of Dieting: How to Live for Life by Dr. Balaji Warren M.D. or listen to The [employer] Podcast Episode 63: Understanding \"Nutritarian\" Eating w/Dr. Balaji Warren  The Game Changers- SafeRentix Documentary on plant based nutrition  The Dr. Reynolds T5 Wellness Plan by Dr. Berlin Reynolds MD  The Complete Guide to fasting by Dr. Salas  @[facility] (InstMarion Hospitalam Dietician with support surrounding nutrition and meal prep/planning)     Education, Motivation and Support Resources  Live to 100: Secrets of the Blue Zones - Netflix series on the secrets to communities living over 100 years old  Atomic Habits by Jamel Sawyer (a book about taking small steps to promote greater behavior change)   Motivation nuria (black  box with white \")- daily supportive messages sent to your phone  Can't Hurt Me by Artemio Kwong (a book exploring the power of discipline in achieving your goals)  Fed Up - documentary about obesity (Free on Utube)  Www.yourweightmatters.org - Obesity Action Coalition sponsored Blog posts  Obesity Action Coalition Resources on topics specific to weight management (www.obesityaction.org)  Fitlosophy Fitspiration - journal to better health (journal book found at [employer] in fitness aisle)  Ede Ambriz talk titled: The Call to Courage (Netflix)  The Exam Room by the Physician's Committee (Podcast)  Nutrition Facts by Dr. Adrian (Podcast)

## 2025-06-27 NOTE — PROGRESS NOTES
[facility] WEIGHT MANAGEMENT VIRTUAL ENCOUNTER     Shruthi Mina verbally consents to a Virtual/Telephone Check-In service on 06/26/25   Patient understands and accepts financial responsibility for any deductible, co-insurance and/or co-pays associated with this service.    HISTORY OF PRESENT ILLNESS  Chief Complaint   Patient presents with    Other     F/u on weight management      Shruthi Mina is a 60 year old female is being evaluated as a video visit using Telemedicine with live, interactive video and audio    Weight gain/loss since LOV based on home monitoring:   Home scale: #209   Has lost  #44 lbs since LOV 4.5 months ago (first month f/u)    Compliance with medication: wegovy 0.5mg weekly x2 weeks   Tolerating well, helping with decreasing appetite and no side effects     Feels great!  Feels like the medication really takes away the food noise  Has really reduced eating out (no more fast food)   Still drinking 6 cans of diet soda per day   When she increased the wegovy 1mg weekly- was very nauseated, feels much better at this lower dosage  Exercise/Activity: 2x/ week, via walking, yard/garden   Nutrition: eating regular meals, +protein, minimal veggies. +interm. tracking reports (more in the beginning)  Stress is manageable   Sleep: 9 hours/night, waking up feeling rested    Denies chest pain, shortness of breath, dizziness, blurred vision, headache, paresthesia, nausea/vomiting.     Wt Readings from Last 6 Encounters:   02/06/25 253 lb (114.8 kg)   01/23/25 260 lb 6.4 oz (118.1 kg)   05/09/24 247 lb 12.8 oz (112.4 kg)   10/19/23 229 lb 6.4 oz (104.1 kg)   12/30/22 210 lb (95.3 kg)   11/30/22 226 lb (102.5 kg)          Subjective  REVIEW OF SYSTEMS  GENERAL HEALTH: feels well otherwise, denied any fevers chills or night sweats   RESPIRATORY: denies shortness of breath   CARDIOVASCULAR: denies chest pain  GI: denies abdominal pain  PSYCH: denies any mood changes    Objective  EXAM  Reviewed most  recent set of vitals   Physical Exam:  GENERAL: well developed, well nourished, in no apparent distress, speaking in full sentences comfortably   SKIN: warm, pink, dry without rashes to exposed area   EYES: conjunctiva pink  HEENT: atraumatic, normocephalic  LUNGS: normal work of breathing, non labored  CARDIO: normal work, no exertion  EXTREMITIES: no cyanosis, no clubbing, no edema  NEURO: Oriented times three  PSYCH: pleasant, cooperative, normal mood and affect    Lab Results   Component Value Date    WBC 6.6 03/10/2025    RBC 5.20 03/10/2025    HGB 15.5 03/10/2025    HCT 46.3 03/10/2025    MCV 89.0 03/10/2025    MCH 29.8 03/10/2025    MCHC 33.5 03/10/2025    RDW 11.9 03/10/2025    .0 03/10/2025     Lab Results   Component Value Date    GLU 82 03/10/2025    BUN 13 03/10/2025    BUNCREA 14.0 04/05/2022    CREATSERUM 1.44 (H) 03/10/2025    ANIONGAP 11 03/10/2025    GFR 81 10/17/2015    GFRNAA 44 (L) 08/23/2021    GFRAA 51 (L) 08/23/2021    CA 9.3 03/10/2025    OSMOCALC 293 03/10/2025    ALKPHO 90 03/10/2025    AST 41 (H) 03/10/2025    ALT 77 (H) 03/10/2025    BILT 0.5 03/10/2025    TP 7.3 03/10/2025    ALB 4.5 03/10/2025    GLOBULIN 2.8 03/10/2025    AGRATIO 1.5 01/10/2011     03/10/2025    K 4.1 03/10/2025     03/10/2025    CO2 26.0 03/10/2025     Lab Results   Component Value Date     03/10/2025    A1C 5.5 03/10/2025     Lab Results   Component Value Date    CHOLEST 229 (H) 03/10/2025    TRIG 157 (H) 03/10/2025    HDL 34 (L) 03/10/2025     (H) 03/10/2025    VLDL 31 (H) 03/10/2025    NONHDLC 195 (H) 03/10/2025     Lab Results   Component Value Date    T4F 1.12 04/05/2022    TSH 2.179 03/10/2025     Lab Results   Component Value Date    B12 541 10/26/2021     Lab Results   Component Value Date    VITD 17.2 (L) 03/10/2025       Medications Ordered Prior to Encounter[1]    ASSESSMENT  Analyzed weight data:       Diagnoses and all orders for this visit:    Therapeutic drug  monitoring    Class 2 obesity    Anxiety    JOSSELINE on CPAP    Hypothyroidism, unspecified type    Primary osteoarthritis of first carpometacarpal joint of left hand    Elevated cholesterol with elevated triglycerides        PLAN  Continue with medications: wegovy 0.5mg weekly (wants to stay at this dosage, due to nausea- with wegovy 1mg)   --advised of side effects and adverse effects of this medication  Contradictions: none  Reviewed labs  HLD  uncontrolled, elevated total, LDL and trigy),  follows with PCP  JOSSELINE- continue with CPAP machine  Great work with reducing eating out (and no fast food)!  Continue with reducing diet soda intake  Anxiety/depression, lack of motivation- referral to behavioral psych (with first appt)   Hypothyroid- stable, continue with current medication regimen, managed by PCP   Advised to monitor blood pressure and pulse at home/ given parameters to review and contact provider.  Nutrition: low carb diet/ recommended to eat breakfast daily/ regular protein intake  Follow up with dietitian and psychologist as recommended.  Discussed the role of sleep and stress in weight management.  Counseled on comprehensive weight loss plan including attention to nutrition, exercise and behavior/stress management for success. See patient instruction below for more details.  Discussed strategies to overcome barriers to successful weight loss and weight maintenance  FITTE: ACSM recommendations (150-300 minutes/ week in active weight loss)       There are no Patient Instructions on file for this visit.    No follow-ups on file.    Patient verbalizes understanding.    Total time spent on chart review, pre-charting, obtaining history, counseling, and educating, reviewing labs was 22 minutes.       Pt understands phone/video evaluation is not a substitute for face to face examination or emergency care. Pt advised to go to the ER or call 911 for worsening symptoms or acute distress.       Please note that the  following visit was completed using two-way, real-time interactive audio and/or video communication.  This has been done in good nayla to provide continuity of care in the best interest of the provider-patient relationship, due to the ongoing public health crisis/national emergency and because of restrictions of visitation.  There are limitations of this visit as no physical exam could be performed.  Every conscious effort was taken to allow for sufficient and adequate time.  This billing was spent on reviewing labs, medications, radiology tests and decision making.  Appropriate medical decision-making and tests are ordered as detailed in the plan of care above.     NOTE TO PATIENT: The 21st Century Cures Act makes clinical notes like these available to patients in the interest of transparency. Clinical notes are medical documents used by physicians and care providers to communicate with each other. These documents include medical language and terminology, abbreviations, and treatment information that may sound technical and at times possibly unfamiliar. In addition, at times, the verbiage may appear blunt or direct. These documents are one tool providers use to communicate relevant information and clinical opinions of the care providers in a way that allows common understanding of the clinical context.     Kala Cat, APRN  6/26/2025         [1]   Current Outpatient Medications on File Prior to Visit   Medication Sig Dispense Refill    ALPRAZolam 0.5 MG Oral Tab Take 1 tablet (0.5 mg total) by mouth daily as needed. 20 tablet 0    semaglutide-weight management 0.5 MG/0.5ML Subcutaneous Solution Auto-injector Inject 0.5 mL (0.5 mg total) into the skin once a week. 2 mL 1    venlafaxine ER 75 MG Oral Capsule SR 24 Hr Take 1 capsule (75 mg total) by mouth daily. 90 capsule 3    WEGOVY 0.5 MG/0.5ML Subcutaneous Solution Auto-injector ADMINISTER 0.5 MG UNDER THE SKIN 1 TIME A WEEK 2 mL 1    semaglutide-weight  management 1 MG/0.5ML Subcutaneous Solution Auto-injector Inject 0.5 mL (1 mg total) into the skin once a week. 2 mL 2    venlafaxine ER 37.5 MG Oral Capsule SR 24 Hr Take 1 capsule (37.5 mg total) by mouth daily. 90 capsule 3    atorvastatin 10 MG Oral Tab Take 1 tablet (10 mg total) by mouth nightly. 90 tablet 0    FLUoxetine 20 MG Oral Cap Take 1 capsule (20 mg total) by mouth daily. 90 capsule 2    ergocalciferol 1.25 MG (85307 UT) Oral Cap Take 1 capsule (50,000 Units total) by mouth twice a week. With food for 16 weeks total then begin OTC Vitamin D at 2000 units daily with food thereafter 32 capsule 0    semaglutide-weight management 0.25 MG/0.5ML Subcutaneous Solution Auto-injector Inject 0.5 mL (0.25 mg total) into the skin once a week. 2 mL 0    clotrimazole-betamethasone 1-0.05 % External Cream Apply 1 Application topically 2 (two) times daily as needed. 1 each 1     No current facility-administered medications on file prior to visit.

## 2025-07-21 DIAGNOSIS — F41.9 ANXIETY: ICD-10-CM

## 2025-07-22 ENCOUNTER — TELEPHONE (OUTPATIENT)
Dept: INTERNAL MEDICINE CLINIC | Facility: CLINIC | Age: 60
End: 2025-07-22

## 2025-07-22 NOTE — TELEPHONE ENCOUNTER
Spoke with pt, was in clinic for husbands appt with BD today (7/22). Per pt, BD indicated pt required urgent appt with Derm for evaluation for \"dark line on thumb nail\" and to call back with appt date/time, as BD would call to expedite sooner appt if needed.   Pt scheduled with Derm MARIAN Damian next Wednesday (7/30) at 6:30pm.  Informed will relay to BD update.  Pt requesting call back to confirm ok. Pt verbalized understanding and agreed with POC.

## 2025-07-22 NOTE — TELEPHONE ENCOUNTER
Called pt to inform as per BD. No further questions or concerns. Pt verbalized understanding and agreed with POC.

## 2025-07-24 RX ORDER — ALPRAZOLAM 0.5 MG
0.5 TABLET ORAL
Qty: 20 TABLET | Refills: 0 | Status: SHIPPED | OUTPATIENT
Start: 2025-07-24

## 2025-07-24 NOTE — TELEPHONE ENCOUNTER
Please review; protocol failed/No Protocol    Recent Fills: 05/08/2025, 06/04/2025, 06/26/2025 #20 for 20 day supply     Last Rx Written: 06/26/2025    Last Office Visit: 01/23/2025

## 2025-08-01 ENCOUNTER — PATIENT MESSAGE (OUTPATIENT)
Dept: INTERNAL MEDICINE CLINIC | Facility: CLINIC | Age: 60
End: 2025-08-01

## 2025-08-06 ENCOUNTER — TELEPHONE (OUTPATIENT)
Dept: INTERNAL MEDICINE CLINIC | Facility: CLINIC | Age: 60
End: 2025-08-06

## 2025-08-22 DIAGNOSIS — E66.812 CLASS 2 OBESITY: ICD-10-CM

## 2025-08-22 RX ORDER — SEMAGLUTIDE 1 MG/.5ML
INJECTION, SOLUTION SUBCUTANEOUS
Qty: 2 ML | Refills: 2 | Status: SHIPPED | OUTPATIENT
Start: 2025-08-22

## 2025-08-28 ENCOUNTER — TELEPHONE (OUTPATIENT)
Dept: INTERNAL MEDICINE CLINIC | Facility: CLINIC | Age: 60
End: 2025-08-28

## (undated) NOTE — LETTER
09/23/21        Karime Christine [id number]      Dear [facility],    [facility] records indicate that you have outstanding lab work and or testing that was ordered for you and has not yet been completed:  Orders Placed This Encounter